# Patient Record
Sex: FEMALE
[De-identification: names, ages, dates, MRNs, and addresses within clinical notes are randomized per-mention and may not be internally consistent; named-entity substitution may affect disease eponyms.]

---

## 2019-11-22 ENCOUNTER — TRANSCRIPTION ENCOUNTER (OUTPATIENT)
Age: 69
End: 2019-11-22

## 2019-11-22 ENCOUNTER — INPATIENT (INPATIENT)
Facility: HOSPITAL | Age: 69
LOS: 4 days | Discharge: HOME | DRG: 907 | End: 2019-11-27
Attending: SURGERY | Admitting: SURGERY
Payer: MEDICARE

## 2019-11-22 VITALS
RESPIRATION RATE: 16 BRPM | SYSTOLIC BLOOD PRESSURE: 154 MMHG | WEIGHT: 153 LBS | DIASTOLIC BLOOD PRESSURE: 76 MMHG | HEIGHT: 68 IN | OXYGEN SATURATION: 99 % | HEART RATE: 99 BPM | TEMPERATURE: 98 F

## 2019-11-22 DIAGNOSIS — R19.8 OTHER SPECIFIED SYMPTOMS AND SIGNS INVOLVING THE DIGESTIVE SYSTEM AND ABDOMEN: ICD-10-CM

## 2019-11-22 LAB
ALBUMIN SERPL ELPH-MCNC: 4.5 G/DL — SIGNIFICANT CHANGE UP (ref 3.3–5)
ALP SERPL-CCNC: 101 U/L — SIGNIFICANT CHANGE UP (ref 40–120)
ALT FLD-CCNC: 25 U/L — SIGNIFICANT CHANGE UP (ref 10–45)
ANION GAP SERPL CALC-SCNC: 19 MMOL/L — HIGH (ref 5–17)
APPEARANCE UR: CLEAR — SIGNIFICANT CHANGE UP
APTT BLD: 23.5 SEC — LOW (ref 27.5–36.3)
AST SERPL-CCNC: 29 U/L — SIGNIFICANT CHANGE UP (ref 10–40)
BACTERIA # UR AUTO: NEGATIVE — SIGNIFICANT CHANGE UP
BASE EXCESS BLDV CALC-SCNC: -1.7 MMOL/L — SIGNIFICANT CHANGE UP (ref -2–2)
BASOPHILS # BLD AUTO: 0.01 K/UL — SIGNIFICANT CHANGE UP (ref 0–0.2)
BASOPHILS NFR BLD AUTO: 0.1 % — SIGNIFICANT CHANGE UP (ref 0–2)
BILIRUB SERPL-MCNC: 0.9 MG/DL — SIGNIFICANT CHANGE UP (ref 0.2–1.2)
BILIRUB UR-MCNC: NEGATIVE — SIGNIFICANT CHANGE UP
BLD GP AB SCN SERPL QL: NEGATIVE — SIGNIFICANT CHANGE UP
BUN SERPL-MCNC: 17 MG/DL — SIGNIFICANT CHANGE UP (ref 7–23)
CA-I SERPL-SCNC: 1.11 MMOL/L — LOW (ref 1.12–1.3)
CALCIUM SERPL-MCNC: 8.9 MG/DL — SIGNIFICANT CHANGE UP (ref 8.4–10.5)
CHLORIDE BLDV-SCNC: 108 MMOL/L — SIGNIFICANT CHANGE UP (ref 96–108)
CHLORIDE SERPL-SCNC: 100 MMOL/L — SIGNIFICANT CHANGE UP (ref 96–108)
CO2 BLDV-SCNC: 25 MMOL/L — SIGNIFICANT CHANGE UP (ref 22–30)
CO2 SERPL-SCNC: 19 MMOL/L — LOW (ref 22–31)
COLOR SPEC: SIGNIFICANT CHANGE UP
CREAT SERPL-MCNC: 0.78 MG/DL — SIGNIFICANT CHANGE UP (ref 0.5–1.3)
DIFF PNL FLD: ABNORMAL
EOSINOPHIL # BLD AUTO: 0.21 K/UL — SIGNIFICANT CHANGE UP (ref 0–0.5)
EOSINOPHIL NFR BLD AUTO: 2.1 % — SIGNIFICANT CHANGE UP (ref 0–6)
EPI CELLS # UR: 5 /HPF — SIGNIFICANT CHANGE UP
GAS PNL BLDV: 136 MMOL/L — SIGNIFICANT CHANGE UP (ref 135–145)
GAS PNL BLDV: SIGNIFICANT CHANGE UP
GLUCOSE BLDV-MCNC: 190 MG/DL — HIGH (ref 70–99)
GLUCOSE SERPL-MCNC: 240 MG/DL — HIGH (ref 70–99)
GLUCOSE UR QL: ABNORMAL
HCO3 BLDV-SCNC: 24 MMOL/L — SIGNIFICANT CHANGE UP (ref 21–29)
HCT VFR BLD CALC: 41.2 % — SIGNIFICANT CHANGE UP (ref 34.5–45)
HCT VFR BLDA CALC: 44 % — SIGNIFICANT CHANGE UP (ref 39–50)
HGB BLD CALC-MCNC: 14.2 G/DL — SIGNIFICANT CHANGE UP (ref 11.5–15.5)
HGB BLD-MCNC: 13.4 G/DL — SIGNIFICANT CHANGE UP (ref 11.5–15.5)
HYALINE CASTS # UR AUTO: 1 /LPF — SIGNIFICANT CHANGE UP (ref 0–2)
IMM GRANULOCYTES NFR BLD AUTO: 0.3 % — SIGNIFICANT CHANGE UP (ref 0–1.5)
INR BLD: 1.01 RATIO — SIGNIFICANT CHANGE UP (ref 0.88–1.16)
KETONES UR-MCNC: SIGNIFICANT CHANGE UP
LACTATE BLDV-MCNC: 3.9 MMOL/L — HIGH (ref 0.7–2)
LEUKOCYTE ESTERASE UR-ACNC: ABNORMAL
LIDOCAIN IGE QN: 10 U/L — SIGNIFICANT CHANGE UP (ref 7–60)
LYMPHOCYTES # BLD AUTO: 0.85 K/UL — LOW (ref 1–3.3)
LYMPHOCYTES # BLD AUTO: 8.5 % — LOW (ref 13–44)
MCHC RBC-ENTMCNC: 29.6 PG — SIGNIFICANT CHANGE UP (ref 27–34)
MCHC RBC-ENTMCNC: 32.5 GM/DL — SIGNIFICANT CHANGE UP (ref 32–36)
MCV RBC AUTO: 91.2 FL — SIGNIFICANT CHANGE UP (ref 80–100)
MONOCYTES # BLD AUTO: 0.34 K/UL — SIGNIFICANT CHANGE UP (ref 0–0.9)
MONOCYTES NFR BLD AUTO: 3.4 % — SIGNIFICANT CHANGE UP (ref 2–14)
NEUTROPHILS # BLD AUTO: 8.57 K/UL — HIGH (ref 1.8–7.4)
NEUTROPHILS NFR BLD AUTO: 85.6 % — HIGH (ref 43–77)
NITRITE UR-MCNC: NEGATIVE — SIGNIFICANT CHANGE UP
NRBC # BLD: 0 /100 WBCS — SIGNIFICANT CHANGE UP (ref 0–0)
PCO2 BLDV: 44 MMHG — SIGNIFICANT CHANGE UP (ref 35–50)
PH BLDV: 7.35 — SIGNIFICANT CHANGE UP (ref 7.35–7.45)
PH UR: 6.5 — SIGNIFICANT CHANGE UP (ref 5–8)
PLATELET # BLD AUTO: 177 K/UL — SIGNIFICANT CHANGE UP (ref 150–400)
PO2 BLDV: 34 MMHG — SIGNIFICANT CHANGE UP (ref 25–45)
POTASSIUM BLDV-SCNC: 3.3 MMOL/L — LOW (ref 3.5–5.3)
POTASSIUM SERPL-MCNC: 3.7 MMOL/L — SIGNIFICANT CHANGE UP (ref 3.5–5.3)
POTASSIUM SERPL-SCNC: 3.7 MMOL/L — SIGNIFICANT CHANGE UP (ref 3.5–5.3)
PROT SERPL-MCNC: 7.6 G/DL — SIGNIFICANT CHANGE UP (ref 6–8.3)
PROT UR-MCNC: SIGNIFICANT CHANGE UP
PROTHROM AB SERPL-ACNC: 11.6 SEC — SIGNIFICANT CHANGE UP (ref 10–12.9)
RBC # BLD: 4.52 M/UL — SIGNIFICANT CHANGE UP (ref 3.8–5.2)
RBC # FLD: 13.2 % — SIGNIFICANT CHANGE UP (ref 10.3–14.5)
RBC CASTS # UR COMP ASSIST: 39 /HPF — HIGH (ref 0–4)
RH IG SCN BLD-IMP: POSITIVE — SIGNIFICANT CHANGE UP
SAO2 % BLDV: 60 % — LOW (ref 67–88)
SODIUM SERPL-SCNC: 138 MMOL/L — SIGNIFICANT CHANGE UP (ref 135–145)
SP GR SPEC: 1.02 — SIGNIFICANT CHANGE UP (ref 1.01–1.02)
UROBILINOGEN FLD QL: NEGATIVE — SIGNIFICANT CHANGE UP
WBC # BLD: 10.01 K/UL — SIGNIFICANT CHANGE UP (ref 3.8–10.5)
WBC # FLD AUTO: 10.01 K/UL — SIGNIFICANT CHANGE UP (ref 3.8–10.5)
WBC UR QL: 14 /HPF — HIGH (ref 0–5)

## 2019-11-22 PROCEDURE — 99291 CRITICAL CARE FIRST HOUR: CPT | Mod: GC

## 2019-11-22 PROCEDURE — 74177 CT ABD & PELVIS W/CONTRAST: CPT | Mod: 26

## 2019-11-22 PROCEDURE — 71046 X-RAY EXAM CHEST 2 VIEWS: CPT | Mod: 26

## 2019-11-22 RX ORDER — PIPERACILLIN AND TAZOBACTAM 4; .5 G/20ML; G/20ML
3.38 INJECTION, POWDER, LYOPHILIZED, FOR SOLUTION INTRAVENOUS ONCE
Refills: 0 | Status: DISCONTINUED | OUTPATIENT
Start: 2019-11-22 | End: 2019-11-22

## 2019-11-22 RX ORDER — ONDANSETRON 8 MG/1
4 TABLET, FILM COATED ORAL ONCE
Refills: 0 | Status: COMPLETED | OUTPATIENT
Start: 2019-11-22 | End: 2019-11-22

## 2019-11-22 RX ORDER — PIPERACILLIN AND TAZOBACTAM 4; .5 G/20ML; G/20ML
3.38 INJECTION, POWDER, LYOPHILIZED, FOR SOLUTION INTRAVENOUS ONCE
Refills: 0 | Status: COMPLETED | OUTPATIENT
Start: 2019-11-22 | End: 2019-11-22

## 2019-11-22 RX ORDER — SODIUM CHLORIDE 9 MG/ML
1000 INJECTION, SOLUTION INTRAVENOUS ONCE
Refills: 0 | Status: COMPLETED | OUTPATIENT
Start: 2019-11-22 | End: 2019-11-22

## 2019-11-22 RX ORDER — MORPHINE SULFATE 50 MG/1
4 CAPSULE, EXTENDED RELEASE ORAL ONCE
Refills: 0 | Status: DISCONTINUED | OUTPATIENT
Start: 2019-11-22 | End: 2019-11-22

## 2019-11-22 RX ADMIN — SODIUM CHLORIDE 1000 MILLILITER(S): 9 INJECTION, SOLUTION INTRAVENOUS at 20:58

## 2019-11-22 RX ADMIN — ONDANSETRON 4 MILLIGRAM(S): 8 TABLET, FILM COATED ORAL at 20:37

## 2019-11-22 RX ADMIN — MORPHINE SULFATE 4 MILLIGRAM(S): 50 CAPSULE, EXTENDED RELEASE ORAL at 21:55

## 2019-11-22 RX ADMIN — ONDANSETRON 4 MILLIGRAM(S): 8 TABLET, FILM COATED ORAL at 21:55

## 2019-11-22 RX ADMIN — MORPHINE SULFATE 4 MILLIGRAM(S): 50 CAPSULE, EXTENDED RELEASE ORAL at 20:35

## 2019-11-22 RX ADMIN — PIPERACILLIN AND TAZOBACTAM 200 GRAM(S): 4; .5 INJECTION, POWDER, LYOPHILIZED, FOR SOLUTION INTRAVENOUS at 21:55

## 2019-11-22 NOTE — ED ADULT NURSE NOTE - NS ED NURSE REPORT GIVEN TO FT
Once admitted, patient going straight to OR. All valuables given to family and sent home. Report given to OR RN. RN aware that patient has belongings in ED. Patient fully undressed wearing gown and hospital socks. Awaiting transport to OR.

## 2019-11-22 NOTE — ED ADULT NURSE NOTE - OBJECTIVE STATEMENT
69F bib family from home with c/o abd pain started after colonoscopy today. Patilolitan reports h/o hemorrhoids which her PMD sent her colonoscopy. Also c/o nausea, abd soft, nt/nd. Denies any urine from blood and stool, reports passing gas.

## 2019-11-22 NOTE — ED PROVIDER NOTE - CLINICAL SUMMARY MEDICAL DECISION MAKING FREE TEXT BOX
70 y/o F presenting for abd pain. Given amount of pain and s/p colonoscopy concern for perforation, will obtain stat CXR to evaluate for free air. - Judah Montiel, DO PGY-1

## 2019-11-22 NOTE — ED PROVIDER NOTE - PHYSICAL EXAMINATION
gen: in distress  Mentation: AAO x 3  psych: mood appropriate  ENT: airway patent  Eyes: conjunctivae clear bilaterally  Cardio: RRR, no m/r/g  Resp: normal BS b/l  GI: soft, diffusely tender  Neuro: sensation and motor function intact  Skin: No evidence of rash  MSK: normal movement of all extremities  Lymph/Vasc: no LE edema

## 2019-11-22 NOTE — ED ADULT NURSE NOTE - NSIMPLEMENTINTERV_GEN_ALL_ED
Implemented All Fall Risk Interventions:  [place] to call system. Call bell, personal items and telephone within reach. Instruct patient to call for assistance. Room bathroom lighting operational. Non-slip footwear when patient is off stretcher. Physically safe environment: no spills, clutter or unnecessary equipment. Stretcher in lowest position, wheels locked, appropriate side rails in place. Provide visual cue, wrist band, yellow gown, etc. Monitor gait and stability. Monitor for mental status changes and reorient to person, place, and time. Review medications for side effects contributing to fall risk. Reinforce activity limits and safety measures with patient and family.

## 2019-11-22 NOTE — ED PROVIDER NOTE - PROGRESS NOTE DETAILS
Notified by radiology that patient has subdiaphragmatic free air, appreciated on xray. Surg consulted for perf mandeep - Judah Montiel DO PGY-1 Attending MD Aden: Surgery in ED evaluating patient

## 2019-11-22 NOTE — ED PROVIDER NOTE - ATTENDING CONTRIBUTION TO CARE
Attending MD Aden: I personally have seen and examined this patient.  Resident note reviewed and agree on plan of care and except where noted.  See below for details.     Seen in RH2, Pepito son  Alma Rosa, declined telephone     69F with no reported PMH/PSH, denies meds, denies allergies presents to the ED with abdominal pain s/p colonoscopy.   Reports had Colonoscopy today at 11am, reports that she had a previous colonoscopy about two months ago that showed only hemorrhoids.  Unknown why she needed repeat today.  Reports colonoscopy was done with Dr. Simental on [facility] in [place].  Reports that was told that everything went ok.  Reports is having diffuse abdominal pain since colonoscopy, worse since 3pm.  Reports had small BM, nonbloody after.  Denies dysuria, hematuria, change in urinary habits including frequency, urgency. Denies chest pain, shortness of breath, palpitations. Denies fevers chills.  On exam, grimacing with movement, head NCAT, PERRL, FROM at neck, no tenderness to midline palpation, no stepoffs along length of spine, lungs CTAB with good inspiratory effort, +S1S2, no m/r/g, abdomen soft with +BS, +tenderness diffusely across abdomen, mostly upper mid to upper abdomen, greatest in L mid and upper abdomen, no rebound, no guarding, no CVAT, moving all extremities with 5/5 strength bilateral upper and lower extremities, good and equal  strength bilaterally; A/P:  69F with abdominal pain after colonoscopy earlier today, CXR to rule out free air under diaphragm, DDx includes perf, colitis, will obtain labs, CTAP, will keep npo, give IVFs, pain control, reasess Attending MD Aden: I personally have seen and examined this patient.  Resident note reviewed and agree on plan of care and except where noted.  See below for details.     Seen in RH2, Pepito son  Alma Rosa, declined telephone     69F with no reported PMH/PSH, denies meds, denies allergies presents to the ED with abdominal pain s/p colonoscopy.   Reports had Colonoscopy today at 11am, reports that she had a previous colonoscopy about two months ago that showed only hemorrhoids.  Unknown why she needed repeat today.  Reports colonoscopy was done with Dr. Simental on [facility] in [place].  Reports that was told that everything went ok.  Reports is having diffuse abdominal pain since colonoscopy, worse since 3pm.  Reports had small BM, nonbloody after.  Denies dysuria, hematuria, change in urinary habits including frequency, urgency. Denies chest pain, shortness of breath, palpitations. Denies fevers chills.  On exam, grimacing with movement, head NCAT, PERRL, FROM at neck, no tenderness to midline palpation, no stepoffs along length of spine, lungs CTAB with good inspiratory effort, +S1S2, no m/r/g, abdomen soft with +BS, +marked tenderness diffusely across abdomen, mostly upper mid to upper abdomen, greatest in L mid and upper abdomen, +peritoneal, no guarding, no CVAT, moving all extremities with 5/5 strength bilateral upper and lower extremities, good and equal  strength bilaterally; A/P:  69F with abdominal pain after colonoscopy earlier today, CXR to rule out free air under diaphragm, high suspicion, DDx includes perf, colitis, will obtain labs, CTAP, will keep npo, give IVFs, pain control, reassess

## 2019-11-22 NOTE — ED PROVIDER NOTE - NS ED ROS FT
CONSTITUTIONAL: No fevers, no chills, no lightheadedness, no dizziness  Eyes: no visual changes  Ears: no ear drainage, no ear pain  Nose: no nasal congestion  Mouth/Throat: no sore throat  CV: No chest pain, no palpitations  PULM: No SOB, no cough  GI: +n/v, abd pain  : no dysuria, no hematuria  SKIN: no rashes.  NEURO: no headache, no focal weakness or numbness  LYMPH/VASC: no LE swelling

## 2019-11-22 NOTE — ED PROVIDER NOTE - OBJECTIVE STATEMENT
68 y/o F with no reported PMH presenting after colonoscopy today. Patient has severe pain with n/v. Not tolerating PO. Passing gas and stool. No blood in stool or vomitus. Was sent in by GI due to amount pain.

## 2019-11-23 ENCOUNTER — RESULT REVIEW (OUTPATIENT)
Age: 69
End: 2019-11-23

## 2019-11-23 LAB
ANION GAP SERPL CALC-SCNC: 19 MMOL/L — HIGH (ref 5–17)
BUN SERPL-MCNC: 10 MG/DL — SIGNIFICANT CHANGE UP (ref 7–23)
CALCIUM SERPL-MCNC: 7.6 MG/DL — LOW (ref 8.4–10.5)
CHLORIDE SERPL-SCNC: 99 MMOL/L — SIGNIFICANT CHANGE UP (ref 96–108)
CO2 SERPL-SCNC: 19 MMOL/L — LOW (ref 22–31)
CREAT SERPL-MCNC: 0.67 MG/DL — SIGNIFICANT CHANGE UP (ref 0.5–1.3)
GLUCOSE SERPL-MCNC: 192 MG/DL — HIGH (ref 70–99)
HCT VFR BLD CALC: 41.8 % — SIGNIFICANT CHANGE UP (ref 34.5–45)
HGB BLD-MCNC: 13.8 G/DL — SIGNIFICANT CHANGE UP (ref 11.5–15.5)
MAGNESIUM SERPL-MCNC: 1.8 MG/DL — SIGNIFICANT CHANGE UP (ref 1.6–2.6)
MCHC RBC-ENTMCNC: 29.9 PG — SIGNIFICANT CHANGE UP (ref 27–34)
MCHC RBC-ENTMCNC: 33 GM/DL — SIGNIFICANT CHANGE UP (ref 32–36)
MCV RBC AUTO: 90.5 FL — SIGNIFICANT CHANGE UP (ref 80–100)
NRBC # BLD: 0 /100 WBCS — SIGNIFICANT CHANGE UP (ref 0–0)
PHOSPHATE SERPL-MCNC: 2.6 MG/DL — SIGNIFICANT CHANGE UP (ref 2.5–4.5)
PLATELET # BLD AUTO: 130 K/UL — LOW (ref 150–400)
POTASSIUM SERPL-MCNC: 3.3 MMOL/L — LOW (ref 3.5–5.3)
POTASSIUM SERPL-SCNC: 3.3 MMOL/L — LOW (ref 3.5–5.3)
RBC # BLD: 4.62 M/UL — SIGNIFICANT CHANGE UP (ref 3.8–5.2)
RBC # FLD: 13.3 % — SIGNIFICANT CHANGE UP (ref 10.3–14.5)
SODIUM SERPL-SCNC: 137 MMOL/L — SIGNIFICANT CHANGE UP (ref 135–145)
WBC # BLD: 6.83 K/UL — SIGNIFICANT CHANGE UP (ref 3.8–10.5)
WBC # FLD AUTO: 6.83 K/UL — SIGNIFICANT CHANGE UP (ref 3.8–10.5)

## 2019-11-23 PROCEDURE — 88307 TISSUE EXAM BY PATHOLOGIST: CPT | Mod: 26

## 2019-11-23 PROCEDURE — 44143 PARTIAL REMOVAL OF COLON: CPT

## 2019-11-23 RX ORDER — PANTOPRAZOLE SODIUM 20 MG/1
40 TABLET, DELAYED RELEASE ORAL DAILY
Refills: 0 | Status: DISCONTINUED | OUTPATIENT
Start: 2019-11-23 | End: 2019-11-23

## 2019-11-23 RX ORDER — ACETAMINOPHEN 500 MG
1000 TABLET ORAL ONCE
Refills: 0 | Status: COMPLETED | OUTPATIENT
Start: 2019-11-23 | End: 2019-11-23

## 2019-11-23 RX ORDER — ENOXAPARIN SODIUM 100 MG/ML
40 INJECTION SUBCUTANEOUS DAILY
Refills: 0 | Status: DISCONTINUED | OUTPATIENT
Start: 2019-11-23 | End: 2019-11-23

## 2019-11-23 RX ORDER — MAGNESIUM SULFATE 500 MG/ML
2 VIAL (ML) INJECTION ONCE
Refills: 0 | Status: COMPLETED | OUTPATIENT
Start: 2019-11-23 | End: 2019-11-23

## 2019-11-23 RX ORDER — HYDROMORPHONE HYDROCHLORIDE 2 MG/ML
0.5 INJECTION INTRAMUSCULAR; INTRAVENOUS; SUBCUTANEOUS EVERY 4 HOURS
Refills: 0 | Status: DISCONTINUED | OUTPATIENT
Start: 2019-11-23 | End: 2019-11-23

## 2019-11-23 RX ORDER — DEXTROSE MONOHYDRATE, SODIUM CHLORIDE, AND POTASSIUM CHLORIDE 50; .745; 4.5 G/1000ML; G/1000ML; G/1000ML
1000 INJECTION, SOLUTION INTRAVENOUS
Refills: 0 | Status: DISCONTINUED | OUTPATIENT
Start: 2019-11-23 | End: 2019-11-27

## 2019-11-23 RX ORDER — SODIUM CHLORIDE 9 MG/ML
1000 INJECTION, SOLUTION INTRAVENOUS
Refills: 0 | Status: DISCONTINUED | OUTPATIENT
Start: 2019-11-23 | End: 2019-11-23

## 2019-11-23 RX ORDER — HYDROMORPHONE HYDROCHLORIDE 2 MG/ML
0.5 INJECTION INTRAMUSCULAR; INTRAVENOUS; SUBCUTANEOUS
Refills: 0 | Status: DISCONTINUED | OUTPATIENT
Start: 2019-11-23 | End: 2019-11-25

## 2019-11-23 RX ORDER — ACETAMINOPHEN 500 MG
1000 TABLET ORAL ONCE
Refills: 0 | Status: COMPLETED | OUTPATIENT
Start: 2019-11-23 | End: 2019-11-24

## 2019-11-23 RX ORDER — HYDROMORPHONE HYDROCHLORIDE 2 MG/ML
1 INJECTION INTRAMUSCULAR; INTRAVENOUS; SUBCUTANEOUS EVERY 4 HOURS
Refills: 0 | Status: DISCONTINUED | OUTPATIENT
Start: 2019-11-23 | End: 2019-11-23

## 2019-11-23 RX ORDER — HYDROMORPHONE HYDROCHLORIDE 2 MG/ML
30 INJECTION INTRAMUSCULAR; INTRAVENOUS; SUBCUTANEOUS
Refills: 0 | Status: DISCONTINUED | OUTPATIENT
Start: 2019-11-23 | End: 2019-11-25

## 2019-11-23 RX ORDER — ONDANSETRON 8 MG/1
4 TABLET, FILM COATED ORAL EVERY 6 HOURS
Refills: 0 | Status: DISCONTINUED | OUTPATIENT
Start: 2019-11-23 | End: 2019-11-25

## 2019-11-23 RX ORDER — POTASSIUM CHLORIDE 20 MEQ
10 PACKET (EA) ORAL
Refills: 0 | Status: COMPLETED | OUTPATIENT
Start: 2019-11-23 | End: 2019-11-23

## 2019-11-23 RX ORDER — ENOXAPARIN SODIUM 100 MG/ML
40 INJECTION SUBCUTANEOUS DAILY
Refills: 0 | Status: DISCONTINUED | OUTPATIENT
Start: 2019-11-23 | End: 2019-11-27

## 2019-11-23 RX ORDER — ACETAMINOPHEN 500 MG
1000 TABLET ORAL ONCE
Refills: 0 | Status: DISCONTINUED | OUTPATIENT
Start: 2019-11-23 | End: 2019-11-23

## 2019-11-23 RX ORDER — SODIUM CHLORIDE 9 MG/ML
1000 INJECTION, SOLUTION INTRAVENOUS ONCE
Refills: 0 | Status: COMPLETED | OUTPATIENT
Start: 2019-11-23 | End: 2019-11-23

## 2019-11-23 RX ORDER — PIPERACILLIN AND TAZOBACTAM 4; .5 G/20ML; G/20ML
3.38 INJECTION, POWDER, LYOPHILIZED, FOR SOLUTION INTRAVENOUS EVERY 8 HOURS
Refills: 0 | Status: DISCONTINUED | OUTPATIENT
Start: 2019-11-23 | End: 2019-11-27

## 2019-11-23 RX ORDER — ONDANSETRON 8 MG/1
4 TABLET, FILM COATED ORAL ONCE
Refills: 0 | Status: DISCONTINUED | OUTPATIENT
Start: 2019-11-23 | End: 2019-11-23

## 2019-11-23 RX ORDER — HYDROMORPHONE HYDROCHLORIDE 2 MG/ML
0.25 INJECTION INTRAMUSCULAR; INTRAVENOUS; SUBCUTANEOUS
Refills: 0 | Status: DISCONTINUED | OUTPATIENT
Start: 2019-11-23 | End: 2019-11-23

## 2019-11-23 RX ORDER — NALOXONE HYDROCHLORIDE 4 MG/.1ML
0.1 SPRAY NASAL
Refills: 0 | Status: DISCONTINUED | OUTPATIENT
Start: 2019-11-23 | End: 2019-11-25

## 2019-11-23 RX ADMIN — ENOXAPARIN SODIUM 40 MILLIGRAM(S): 100 INJECTION SUBCUTANEOUS at 12:55

## 2019-11-23 RX ADMIN — SODIUM CHLORIDE 100 MILLILITER(S): 9 INJECTION, SOLUTION INTRAVENOUS at 10:08

## 2019-11-23 RX ADMIN — PIPERACILLIN AND TAZOBACTAM 25 GRAM(S): 4; .5 INJECTION, POWDER, LYOPHILIZED, FOR SOLUTION INTRAVENOUS at 05:37

## 2019-11-23 RX ADMIN — PIPERACILLIN AND TAZOBACTAM 25 GRAM(S): 4; .5 INJECTION, POWDER, LYOPHILIZED, FOR SOLUTION INTRAVENOUS at 21:23

## 2019-11-23 RX ADMIN — PIPERACILLIN AND TAZOBACTAM 25 GRAM(S): 4; .5 INJECTION, POWDER, LYOPHILIZED, FOR SOLUTION INTRAVENOUS at 14:47

## 2019-11-23 RX ADMIN — Medication 100 MILLIEQUIVALENT(S): at 14:02

## 2019-11-23 RX ADMIN — HYDROMORPHONE HYDROCHLORIDE 30 MILLILITER(S): 2 INJECTION INTRAMUSCULAR; INTRAVENOUS; SUBCUTANEOUS at 09:50

## 2019-11-23 RX ADMIN — HYDROMORPHONE HYDROCHLORIDE 0.25 MILLIGRAM(S): 2 INJECTION INTRAMUSCULAR; INTRAVENOUS; SUBCUTANEOUS at 05:31

## 2019-11-23 RX ADMIN — Medication 1000 MILLIGRAM(S): at 23:35

## 2019-11-23 RX ADMIN — HYDROMORPHONE HYDROCHLORIDE 30 MILLILITER(S): 2 INJECTION INTRAMUSCULAR; INTRAVENOUS; SUBCUTANEOUS at 10:04

## 2019-11-23 RX ADMIN — HYDROMORPHONE HYDROCHLORIDE 30 MILLILITER(S): 2 INJECTION INTRAMUSCULAR; INTRAVENOUS; SUBCUTANEOUS at 08:24

## 2019-11-23 RX ADMIN — DEXTROSE MONOHYDRATE, SODIUM CHLORIDE, AND POTASSIUM CHLORIDE 75 MILLILITER(S): 50; .745; 4.5 INJECTION, SOLUTION INTRAVENOUS at 17:09

## 2019-11-23 RX ADMIN — Medication 100 MILLIEQUIVALENT(S): at 16:10

## 2019-11-23 RX ADMIN — Medication 400 MILLIGRAM(S): at 17:06

## 2019-11-23 RX ADMIN — Medication 1000 MILLIGRAM(S): at 11:00

## 2019-11-23 RX ADMIN — HYDROMORPHONE HYDROCHLORIDE 0.25 MILLIGRAM(S): 2 INJECTION INTRAMUSCULAR; INTRAVENOUS; SUBCUTANEOUS at 06:01

## 2019-11-23 RX ADMIN — SODIUM CHLORIDE 1000 MILLILITER(S): 9 INJECTION, SOLUTION INTRAVENOUS at 00:22

## 2019-11-23 RX ADMIN — HYDROMORPHONE HYDROCHLORIDE 30 MILLILITER(S): 2 INJECTION INTRAMUSCULAR; INTRAVENOUS; SUBCUTANEOUS at 06:36

## 2019-11-23 RX ADMIN — Medication 400 MILLIGRAM(S): at 10:27

## 2019-11-23 RX ADMIN — HYDROMORPHONE HYDROCHLORIDE 30 MILLILITER(S): 2 INJECTION INTRAMUSCULAR; INTRAVENOUS; SUBCUTANEOUS at 19:53

## 2019-11-23 RX ADMIN — Medication 1000 MILLIGRAM(S): at 17:50

## 2019-11-23 RX ADMIN — Medication 50 GRAM(S): at 12:56

## 2019-11-23 RX ADMIN — Medication 100 MILLIEQUIVALENT(S): at 17:49

## 2019-11-23 RX ADMIN — SODIUM CHLORIDE 1000 MILLILITER(S): 9 INJECTION, SOLUTION INTRAVENOUS at 00:21

## 2019-11-23 RX ADMIN — Medication 62.5 MILLIMOLE(S): at 19:09

## 2019-11-23 RX ADMIN — Medication 400 MILLIGRAM(S): at 23:09

## 2019-11-23 RX ADMIN — SODIUM CHLORIDE 100 MILLILITER(S): 9 INJECTION, SOLUTION INTRAVENOUS at 05:39

## 2019-11-23 NOTE — H&P ADULT - ASSESSMENT
69F with no significant pmh presenting with abdominal pain after screening colonoscopy with polypectomy and biopsies noted to have bowel perforation on imaging    - Admit to Surgery, Dr Zepeda  - OR for emergent exploratory laparotomy  - NPO/IVF  - Abx: Zosyn  - Pain control  - DVT ppx: Kael Flores, PGY2  Acute Care Surgery  [phone] with any questions

## 2019-11-23 NOTE — PRE-ANESTHESIA EVALUATION ADULT - NSANTHOSAYNRD_GEN_A_CORE
No. JERAMIE screening performed.  STOP BANG Legend: 0-2 = LOW Risk; 3-4 = INTERMEDIATE Risk; 5-8 = HIGH Risk

## 2019-11-23 NOTE — BRIEF OPERATIVE NOTE - NSICDXBRIEFPREOP_GEN_ALL_CORE_FT
PRE-OP DIAGNOSIS:  Perforation of colon as colonoscopy complication 23-Nov-2019 08:05:16  Malcolm Boyce

## 2019-11-23 NOTE — H&P ADULT - HISTORY OF PRESENT ILLNESS
[facility] General Surgery H&P    Patient is a 69y old  Female who presents with a chief complaint of abdominal pain.    HPI:    69F with no significant pmh presenting with abdominal pain. Patient states she began having diffuse abdominal pain a few hours after screening colonoscopy at 10AM. Per GI physician (Dr. Simental), patient underwent sigmoid polypectomy and multiple biopsies. Patient currently has severe pain with episodes of nausea and vomiting. Passing gas and non-bloody/non-melanotic stool. Patient was sent in by GI due to amount pain. In ED, patient had CXR which demonstrated significant subdiaphragmatic air. Surgery was consulted for evaluation.    PAST MEDICAL & SURGICAL HISTORY:  No pertinent past medical history  No significant past surgical history      FAMILY HISTORY:    SOCIAL HISTORY:  - No smoking or ETOH use  - Lives in private residence  - Active lifestyle    MEDICATIONS  (STANDING):    MEDICATIONS  (PRN):    Allergies    No Known Allergies    Intolerances        Vital Signs Last 24 Hrs  T(C): 36.6 (2019 21:01), Max: 36.6 (2019 21:01)  T(F): 97.9 (2019 21:01), Max: 97.9 (2019 21:01)  HR: 106 (2019 22:11) (88 - 106)  BP: 128/79 (2019 22:11) (128/79 - 154/76)  BP(mean): --  RR: 18 (2019 22:11) (16 - 18)  SpO2: 99% (2019 22:11) (99% - 99%)  Daily Height in cm: 172.72 (2019 19:51)    Daily     General: NAD, well-nourished  HEENT: Atraumatic, EOMI  Resp: Breathing comfortably on RA  CV: Normal sinus rhythm  Abd: soft, moderately distended, exquisite diffuse tenderness with guarding consistent with peritonitis  Ext: ROMIx4, motor strength intact x 4                          13.4   10.01 )-----------( 177      ( 2019 20:54 )             41.2     11-    138  |  100  |  17  ----------------------------<  240<H>  3.7   |  19<L>  |  0.78    Ca    8.9      2019 20:54    TPro  7.6  /  Alb  4.5  /  TBili  0.9  /  DBili  x   /  AST  29  /  ALT  25  /  AlkPhos  101  11-22    PT/INR - ( 2019 20:54 )   PT: 11.6 sec;   INR: 1.01 ratio         PTT - ( 2019 20:54 )  PTT:23.5 sec  Urinalysis Basic - ( 2019 22:14 )    Color: Light Yellow / Appearance: Clear / S.017 / pH: x  Gluc: x / Ketone: Trace  / Bili: Negative / Urobili: Negative   Blood: x / Protein: Trace / Nitrite: Negative   Leuk Esterase: Moderate / RBC: 39 /hpf / WBC 14 /HPF   Sq Epi: x / Non Sq Epi: 5 /hpf / Bacteria: Negative        Radiographic Findings:     < from: Xray Chest 2 Views PA/Lat (19 @ 21:16) >  ******PRELIMINARY REPORT******              INTERPRETATION:  subdiaphragmatic free air.     clear lungs     d/w Dr. Montiel    follow up official report in AM              ******PRELIMINARY REPORT******    ******PRELIMINARY REPORT******          BRIE CLAROS M.D., RADIOLOGY RESIDENT    < end of copied text >        Assessment: [facility] General Surgery H&P    Patient is a 69y old  Female who presents with a chief complaint of abdominal pain.    HPI:    69F with no significant pmh presenting with abdominal pain. Patient states she began having diffuse abdominal pain a few hours after screening colonoscopy at 10AM. Per GI physician (Dr. Simental), patient underwent sigmoid polypectomy and multiple biopsies. Patient currently has severe pain with episodes of nausea and vomiting. Passing gas and non-bloody/non-melanotic stool. Patient was sent in by GI due to amount pain. In ED, patient had CXR which demonstrated significant subdiaphragmatic air. Surgery was consulted for evaluation.    PAST MEDICAL & SURGICAL HISTORY:  No pertinent past medical history  No significant past surgical history      FAMILY HISTORY:    SOCIAL HISTORY:  - No smoking or ETOH use  - Lives in private residence  - Active lifestyle    MEDICATIONS  (STANDING):    MEDICATIONS  (PRN):    Allergies    No Known Allergies    Intolerances        Vital Signs Last 24 Hrs  T(C): 36.6 (2019 21:01), Max: 36.6 (2019 21:01)  T(F): 97.9 (2019 21:01), Max: 97.9 (2019 21:01)  HR: 106 (2019 22:11) (88 - 106)  BP: 128/79 (2019 22:11) (128/79 - 154/76)  BP(mean): --  RR: 18 (2019 22:11) (16 - 18)  SpO2: 99% (2019 22:11) (99% - 99%)  Daily Height in cm: 172.72 (2019 19:51)    Daily     General: NAD, well-nourished  HEENT: Atraumatic, EOMI  Resp: Breathing comfortably on RA  CV: Normal sinus rhythm  Abd: soft, moderately distended, exquisite diffuse tenderness with guarding consistent with peritonitis  Ext: ROMIx4, motor strength intact x 4                          13.4   10.01 )-----------( 177      ( 2019 20:54 )             41.2     11-    138  |  100  |  17  ----------------------------<  240<H>  3.7   |  19<L>  |  0.78    Ca    8.9      2019 20:54    TPro  7.6  /  Alb  4.5  /  TBili  0.9  /  DBili  x   /  AST  29  /  ALT  25  /  AlkPhos  101  11-22    PT/INR - ( 2019 20:54 )   PT: 11.6 sec;   INR: 1.01 ratio         PTT - ( 2019 20:54 )  PTT:23.5 sec  Urinalysis Basic - ( 2019 22:14 )    Color: Light Yellow / Appearance: Clear / S.017 / pH: x  Gluc: x / Ketone: Trace  / Bili: Negative / Urobili: Negative   Blood: x / Protein: Trace / Nitrite: Negative   Leuk Esterase: Moderate / RBC: 39 /hpf / WBC 14 /HPF   Sq Epi: x / Non Sq Epi: 5 /hpf / Bacteria: Negative        Radiographic Findings:     < from: Xray Chest 2 Views PA/Lat (19 @ 21:16) >  ******PRELIMINARY REPORT******              INTERPRETATION:  subdiaphragmatic free air.     clear lungs     d/w Dr. Montiel    follow up official report in AM              ******PRELIMINARY REPORT******    ******PRELIMINARY REPORT******          BRIE CLAROS M.D., RADIOLOGY RESIDENT    < end of copied text >

## 2019-11-23 NOTE — BRIEF OPERATIVE NOTE - OPERATION/FINDINGS
Patient s/p colonoscopy with polypectomy earlier today found to have perforated viscus based on history/exam/scan. Taken for exlap, found to have turbid ascites and hyperemic small bowel (entire small bowel affected). Perforation found on anterior aspect of distal sigmoid colon. We excised affected segment of colon using blue-load staplers and matured an end colostomy, which was viable. Peritoneal cavity washed out with liters of warm NS.

## 2019-11-23 NOTE — ED ADULT NURSE REASSESSMENT NOTE - NS ED NURSE REASSESS COMMENT FT1
Nichols catheter placed using sterile technique. Second RN present to confirm sterility. Draining to gravity. Secured w/ stat lock. Initial output off approx. 400cc's urine. Patient tolerated procedure well. Will cont to monitor.

## 2019-11-23 NOTE — BRIEF OPERATIVE NOTE - NSICDXBRIEFPOSTOP_GEN_ALL_CORE_FT
POST-OP DIAGNOSIS:  Perforation of colon as colonoscopy complication 23-Nov-2019 08:05:29  Malcolm Boyce

## 2019-11-23 NOTE — PRE-ANESTHESIA EVALUATION ADULT - NSANTHPMHFT_GEN_ALL_CORE
70 y/o F with no reported PMH , s/p colonoscopy  today c/o sever pain w n/v abd pain. Passing gas and stool. No blood in stool or vomitus. Was sent in by GI due to amount pain.  cxray subdiaphragmatic free air 70 y/o F with no reported PMH , s/p colonoscopy  today c/o sever pain w n/v abd pain. Passing gas and stool. No blood in stool or vomitus. Was sent in by GI due to amount pain.  cxray subdiaphragmatic free air  h/o cholecystectomy and thyroidectomy

## 2019-11-24 LAB
ANION GAP SERPL CALC-SCNC: 11 MMOL/L — SIGNIFICANT CHANGE UP (ref 5–17)
BUN SERPL-MCNC: 10 MG/DL — SIGNIFICANT CHANGE UP (ref 7–23)
CALCIUM SERPL-MCNC: 7.8 MG/DL — LOW (ref 8.4–10.5)
CHLORIDE SERPL-SCNC: 105 MMOL/L — SIGNIFICANT CHANGE UP (ref 96–108)
CO2 SERPL-SCNC: 24 MMOL/L — SIGNIFICANT CHANGE UP (ref 22–31)
CREAT SERPL-MCNC: 0.85 MG/DL — SIGNIFICANT CHANGE UP (ref 0.5–1.3)
CULTURE RESULTS: SIGNIFICANT CHANGE UP
GLUCOSE SERPL-MCNC: 141 MG/DL — HIGH (ref 70–99)
HCT VFR BLD CALC: 40.5 % — SIGNIFICANT CHANGE UP (ref 34.5–45)
HCV AB S/CO SERPL IA: 0.1 S/CO — SIGNIFICANT CHANGE UP (ref 0–0.99)
HCV AB SERPL-IMP: SIGNIFICANT CHANGE UP
HGB BLD-MCNC: 13 G/DL — SIGNIFICANT CHANGE UP (ref 11.5–15.5)
MAGNESIUM SERPL-MCNC: 2.4 MG/DL — SIGNIFICANT CHANGE UP (ref 1.6–2.6)
MCHC RBC-ENTMCNC: 30.4 PG — SIGNIFICANT CHANGE UP (ref 27–34)
MCHC RBC-ENTMCNC: 32.1 GM/DL — SIGNIFICANT CHANGE UP (ref 32–36)
MCV RBC AUTO: 94.6 FL — SIGNIFICANT CHANGE UP (ref 80–100)
NRBC # BLD: 0 /100 WBCS — SIGNIFICANT CHANGE UP (ref 0–0)
PHOSPHATE SERPL-MCNC: 1.7 MG/DL — LOW (ref 2.5–4.5)
PHOSPHATE SERPL-MCNC: 3.9 MG/DL — SIGNIFICANT CHANGE UP (ref 2.5–4.5)
PLATELET # BLD AUTO: 89 K/UL — LOW (ref 150–400)
POTASSIUM SERPL-MCNC: 4.1 MMOL/L — SIGNIFICANT CHANGE UP (ref 3.5–5.3)
POTASSIUM SERPL-SCNC: 4.1 MMOL/L — SIGNIFICANT CHANGE UP (ref 3.5–5.3)
RBC # BLD: 4.28 M/UL — SIGNIFICANT CHANGE UP (ref 3.8–5.2)
RBC # FLD: 14 % — SIGNIFICANT CHANGE UP (ref 10.3–14.5)
SODIUM SERPL-SCNC: 140 MMOL/L — SIGNIFICANT CHANGE UP (ref 135–145)
SPECIMEN SOURCE: SIGNIFICANT CHANGE UP
WBC # BLD: 8.24 K/UL — SIGNIFICANT CHANGE UP (ref 3.8–10.5)
WBC # FLD AUTO: 8.24 K/UL — SIGNIFICANT CHANGE UP (ref 3.8–10.5)

## 2019-11-24 RX ORDER — ACETAMINOPHEN 500 MG
1050 TABLET ORAL EVERY 6 HOURS
Refills: 0 | Status: DISCONTINUED | OUTPATIENT
Start: 2019-11-24 | End: 2019-11-24

## 2019-11-24 RX ORDER — ESCITALOPRAM OXALATE 10 MG/1
10 TABLET, FILM COATED ORAL AT BEDTIME
Refills: 0 | Status: DISCONTINUED | OUTPATIENT
Start: 2019-11-24 | End: 2019-11-27

## 2019-11-24 RX ORDER — ACETAMINOPHEN 500 MG
1000 TABLET ORAL EVERY 6 HOURS
Refills: 0 | Status: COMPLETED | OUTPATIENT
Start: 2019-11-24 | End: 2019-11-25

## 2019-11-24 RX ADMIN — Medication 400 MILLIGRAM(S): at 12:43

## 2019-11-24 RX ADMIN — ENOXAPARIN SODIUM 40 MILLIGRAM(S): 100 INJECTION SUBCUTANEOUS at 12:46

## 2019-11-24 RX ADMIN — HYDROMORPHONE HYDROCHLORIDE 30 MILLILITER(S): 2 INJECTION INTRAMUSCULAR; INTRAVENOUS; SUBCUTANEOUS at 07:38

## 2019-11-24 RX ADMIN — PIPERACILLIN AND TAZOBACTAM 25 GRAM(S): 4; .5 INJECTION, POWDER, LYOPHILIZED, FOR SOLUTION INTRAVENOUS at 21:35

## 2019-11-24 RX ADMIN — HYDROMORPHONE HYDROCHLORIDE 30 MILLILITER(S): 2 INJECTION INTRAMUSCULAR; INTRAVENOUS; SUBCUTANEOUS at 19:43

## 2019-11-24 RX ADMIN — Medication 1000 MILLIGRAM(S): at 13:10

## 2019-11-24 RX ADMIN — ONDANSETRON 4 MILLIGRAM(S): 8 TABLET, FILM COATED ORAL at 17:55

## 2019-11-24 RX ADMIN — PIPERACILLIN AND TAZOBACTAM 25 GRAM(S): 4; .5 INJECTION, POWDER, LYOPHILIZED, FOR SOLUTION INTRAVENOUS at 05:26

## 2019-11-24 RX ADMIN — PIPERACILLIN AND TAZOBACTAM 25 GRAM(S): 4; .5 INJECTION, POWDER, LYOPHILIZED, FOR SOLUTION INTRAVENOUS at 13:23

## 2019-11-24 RX ADMIN — Medication 85 MILLIMOLE(S): at 15:44

## 2019-11-24 RX ADMIN — ESCITALOPRAM OXALATE 10 MILLIGRAM(S): 10 TABLET, FILM COATED ORAL at 21:35

## 2019-11-24 RX ADMIN — Medication 400 MILLIGRAM(S): at 18:00

## 2019-11-24 NOTE — PROGRESS NOTE ADULT - ATTENDING COMMENTS
Patient seen and examined with house staff  Doing well  vitals stable  afebrile   ostomy viable    - awaiting return of GI function  - post op antibiotics x 5 days for colonic perforation

## 2019-11-24 NOTE — PHYSICAL THERAPY INITIAL EVALUATION ADULT - ADDITIONAL COMMENTS
As per pt, pt lives in a private house w/ spouse and son and 7 steps to enter w/ UHR. PTA pt was independent w/ all mobility and ADLs.

## 2019-11-24 NOTE — PHYSICAL THERAPY INITIAL EVALUATION ADULT - PERTINENT HX OF CURRENT PROBLEM, REHAB EVAL
Pt is a 69F with no significant pmh presenting with abdominal pain. Per GI physician (Dr. Simental), patient underwent sigmoid polypectomy and multiple biopsies. In ED, patient had CXR which demonstrated significant subdiaphragmatic air.  CT Abdomen: Large pneumoperitoneum and small free fluid secondary to colonic perforation along the mid to distal sigmoid colon. Now s/p Isaias colectomy on 11/23.

## 2019-11-24 NOTE — PHYSICAL THERAPY INITIAL EVALUATION ADULT - PLANNED THERAPY INTERVENTIONS, PT EVAL
GOAL: Pt will perform 7 stairs with or without U HR as needed within 3-4weeks./balance training/bed mobility training/gait training/transfer training

## 2019-11-25 DIAGNOSIS — R19.8 OTHER SPECIFIED SYMPTOMS AND SIGNS INVOLVING THE DIGESTIVE SYSTEM AND ABDOMEN: ICD-10-CM

## 2019-11-25 DIAGNOSIS — R11.0 NAUSEA: ICD-10-CM

## 2019-11-25 DIAGNOSIS — Z79.899 OTHER LONG TERM (CURRENT) DRUG THERAPY: ICD-10-CM

## 2019-11-25 DIAGNOSIS — Z02.9 ENCOUNTER FOR ADMINISTRATIVE EXAMINATIONS, UNSPECIFIED: ICD-10-CM

## 2019-11-25 DIAGNOSIS — F41.9 ANXIETY DISORDER, UNSPECIFIED: ICD-10-CM

## 2019-11-25 LAB
ANION GAP SERPL CALC-SCNC: 10 MMOL/L — SIGNIFICANT CHANGE UP (ref 5–17)
BUN SERPL-MCNC: 11 MG/DL — SIGNIFICANT CHANGE UP (ref 7–23)
CALCIUM SERPL-MCNC: 7.8 MG/DL — LOW (ref 8.4–10.5)
CHLORIDE SERPL-SCNC: 106 MMOL/L — SIGNIFICANT CHANGE UP (ref 96–108)
CO2 SERPL-SCNC: 23 MMOL/L — SIGNIFICANT CHANGE UP (ref 22–31)
CREAT SERPL-MCNC: 0.75 MG/DL — SIGNIFICANT CHANGE UP (ref 0.5–1.3)
GLUCOSE SERPL-MCNC: 98 MG/DL — SIGNIFICANT CHANGE UP (ref 70–99)
HCT VFR BLD CALC: 35 % — SIGNIFICANT CHANGE UP (ref 34.5–45)
HGB BLD-MCNC: 11.5 G/DL — SIGNIFICANT CHANGE UP (ref 11.5–15.5)
MAGNESIUM SERPL-MCNC: 2.1 MG/DL — SIGNIFICANT CHANGE UP (ref 1.6–2.6)
MCHC RBC-ENTMCNC: 29.7 PG — SIGNIFICANT CHANGE UP (ref 27–34)
MCHC RBC-ENTMCNC: 32.9 GM/DL — SIGNIFICANT CHANGE UP (ref 32–36)
MCV RBC AUTO: 90.4 FL — SIGNIFICANT CHANGE UP (ref 80–100)
NRBC # BLD: 0 /100 WBCS — SIGNIFICANT CHANGE UP (ref 0–0)
PHOSPHATE SERPL-MCNC: 2.5 MG/DL — SIGNIFICANT CHANGE UP (ref 2.5–4.5)
PLATELET # BLD AUTO: 134 K/UL — LOW (ref 150–400)
POTASSIUM SERPL-MCNC: 4 MMOL/L — SIGNIFICANT CHANGE UP (ref 3.5–5.3)
POTASSIUM SERPL-SCNC: 4 MMOL/L — SIGNIFICANT CHANGE UP (ref 3.5–5.3)
RBC # BLD: 3.87 M/UL — SIGNIFICANT CHANGE UP (ref 3.8–5.2)
RBC # FLD: 13.8 % — SIGNIFICANT CHANGE UP (ref 10.3–14.5)
SODIUM SERPL-SCNC: 139 MMOL/L — SIGNIFICANT CHANGE UP (ref 135–145)
WBC # BLD: 9.11 K/UL — SIGNIFICANT CHANGE UP (ref 3.8–10.5)
WBC # FLD AUTO: 9.11 K/UL — SIGNIFICANT CHANGE UP (ref 3.8–10.5)

## 2019-11-25 PROCEDURE — 99223 1ST HOSP IP/OBS HIGH 75: CPT

## 2019-11-25 PROCEDURE — 74018 RADEX ABDOMEN 1 VIEW: CPT | Mod: 26

## 2019-11-25 RX ORDER — METOCLOPRAMIDE HCL 10 MG
10 TABLET ORAL ONCE
Refills: 0 | Status: COMPLETED | OUTPATIENT
Start: 2019-11-25 | End: 2019-11-25

## 2019-11-25 RX ORDER — ACETAMINOPHEN 500 MG
1000 TABLET ORAL ONCE
Refills: 0 | Status: COMPLETED | OUTPATIENT
Start: 2019-11-25 | End: 2019-11-25

## 2019-11-25 RX ORDER — MORPHINE SULFATE 50 MG/1
2 CAPSULE, EXTENDED RELEASE ORAL EVERY 4 HOURS
Refills: 0 | Status: DISCONTINUED | OUTPATIENT
Start: 2019-11-25 | End: 2019-11-27

## 2019-11-25 RX ORDER — ONDANSETRON 8 MG/1
8 TABLET, FILM COATED ORAL EVERY 8 HOURS
Refills: 0 | Status: DISCONTINUED | OUTPATIENT
Start: 2019-11-25 | End: 2019-11-27

## 2019-11-25 RX ORDER — HYDROMORPHONE HYDROCHLORIDE 2 MG/ML
30 INJECTION INTRAMUSCULAR; INTRAVENOUS; SUBCUTANEOUS
Refills: 0 | Status: DISCONTINUED | OUTPATIENT
Start: 2019-11-25 | End: 2019-11-25

## 2019-11-25 RX ORDER — ONDANSETRON 8 MG/1
4 TABLET, FILM COATED ORAL ONCE
Refills: 0 | Status: COMPLETED | OUTPATIENT
Start: 2019-11-25 | End: 2019-11-25

## 2019-11-25 RX ADMIN — Medication 10 MILLIGRAM(S): at 21:03

## 2019-11-25 RX ADMIN — Medication 0.5 MILLIGRAM(S): at 16:38

## 2019-11-25 RX ADMIN — ESCITALOPRAM OXALATE 10 MILLIGRAM(S): 10 TABLET, FILM COATED ORAL at 21:03

## 2019-11-25 RX ADMIN — ONDANSETRON 4 MILLIGRAM(S): 8 TABLET, FILM COATED ORAL at 06:36

## 2019-11-25 RX ADMIN — Medication 0.5 MILLIGRAM(S): at 23:17

## 2019-11-25 RX ADMIN — Medication 400 MILLIGRAM(S): at 11:27

## 2019-11-25 RX ADMIN — DEXTROSE MONOHYDRATE, SODIUM CHLORIDE, AND POTASSIUM CHLORIDE 75 MILLILITER(S): 50; .745; 4.5 INJECTION, SOLUTION INTRAVENOUS at 00:02

## 2019-11-25 RX ADMIN — Medication 10 MILLIGRAM(S): at 15:08

## 2019-11-25 RX ADMIN — PIPERACILLIN AND TAZOBACTAM 25 GRAM(S): 4; .5 INJECTION, POWDER, LYOPHILIZED, FOR SOLUTION INTRAVENOUS at 21:03

## 2019-11-25 RX ADMIN — Medication 400 MILLIGRAM(S): at 23:17

## 2019-11-25 RX ADMIN — ONDANSETRON 4 MILLIGRAM(S): 8 TABLET, FILM COATED ORAL at 08:15

## 2019-11-25 RX ADMIN — Medication 400 MILLIGRAM(S): at 06:02

## 2019-11-25 RX ADMIN — HYDROMORPHONE HYDROCHLORIDE 30 MILLILITER(S): 2 INJECTION INTRAMUSCULAR; INTRAVENOUS; SUBCUTANEOUS at 07:31

## 2019-11-25 RX ADMIN — PIPERACILLIN AND TAZOBACTAM 25 GRAM(S): 4; .5 INJECTION, POWDER, LYOPHILIZED, FOR SOLUTION INTRAVENOUS at 06:02

## 2019-11-25 RX ADMIN — ONDANSETRON 8 MILLIGRAM(S): 8 TABLET, FILM COATED ORAL at 12:19

## 2019-11-25 RX ADMIN — Medication 400 MILLIGRAM(S): at 00:03

## 2019-11-25 RX ADMIN — Medication 1000 MILLIGRAM(S): at 19:09

## 2019-11-25 RX ADMIN — PIPERACILLIN AND TAZOBACTAM 25 GRAM(S): 4; .5 INJECTION, POWDER, LYOPHILIZED, FOR SOLUTION INTRAVENOUS at 14:12

## 2019-11-25 RX ADMIN — Medication 1000 MILLIGRAM(S): at 00:25

## 2019-11-25 RX ADMIN — ENOXAPARIN SODIUM 40 MILLIGRAM(S): 100 INJECTION SUBCUTANEOUS at 11:29

## 2019-11-25 RX ADMIN — Medication 400 MILLIGRAM(S): at 18:44

## 2019-11-25 RX ADMIN — Medication 1000 MILLIGRAM(S): at 06:36

## 2019-11-25 RX ADMIN — ONDANSETRON 4 MILLIGRAM(S): 8 TABLET, FILM COATED ORAL at 02:51

## 2019-11-25 NOTE — CONSULT NOTE ADULT - PROBLEM SELECTOR RECOMMENDATION 5
Transitions of Care Status:  1.  Name of PCP: Unk  2.  PCP Contacted on Admission: Unknown  3.  PCP contacted at Discharge: [ ] Y    [ ] N    [ ] N/A  4.  Post-Discharge Appointment Date and Location:  5.  Summary of Handoff given to PCP:

## 2019-11-25 NOTE — CONSULT NOTE ADULT - SUBJECTIVE AND OBJECTIVE BOX
HPI:  [facility] Surgery H&P    Patient is a 69y old  Female who presents with a chief complaint of abdominal pain.    HPI:    69F with no significant pmh presenting with abdominal pain. Patient states she began having diffuse abdominal pain a few hours after screening colonoscopy at 10AM. Per GI physician (Dr. Simental), patient underwent sigmoid polypectomy and multiple biopsies. Patient currently has severe pain with episodes of nausea and vomiting. Passing gas and non-bloody/non-melanotic stool. Patient was sent in by GI due to amount pain. In ED, patient had CXR which demonstrated significant subdiaphragmatic air. Surgery was consulted for evaluation, patient found to have colonic perforation, now s/p OR for sergo's procedure, currently stable on med/surg floor.    At present, patient reports 'nerves' and 'nausea,' with intermittent nbnb vomiting. No fevers. States she takes xanax prn at home.    14 point ROS otherwise negative.     PAST MEDICAL & SURGICAL HISTORY:  No pertinent past medical history  No significant past surgical history      FAMILY HISTORY: noncontributory    SOCIAL HISTORY:  - No smoking or ETOH use  - Lives in private residence  - Active lifestyle    home MEDICATIONS  (STANDING): pt does not know full med lists or pharmacy    inpatient MEDICATIONS  (STANDING):  acetaminophen  IVPB .. 1000 milliGRAM(s) IV Intermittent once  acetaminophen  IVPB .. 1000 milliGRAM(s) IV Intermittent once  dextrose 5% + sodium chloride 0.45% with potassium chloride 20 mEq/L 1000 milliLiter(s) (75 mL/Hr) IV Continuous <Continuous>  enoxaparin Injectable 40 milliGRAM(s) SubCutaneous daily  escitalopram 10 milliGRAM(s) Oral at bedtime  piperacillin/tazobactam IVPB.. 3.375 Gram(s) IV Intermittent every 8 hours    MEDICATIONS  (PRN):  LORazepam   Injectable 0.5 milliGRAM(s) IV Push two times a day PRN Anxiety  morphine  - Injectable 2 milliGRAM(s) IV Push every 4 hours PRN Severe Pain (7 - 10)  ondansetron Injectable 8 milliGRAM(s) IV Push every 8 hours PRN Nausea and/or Vomiting      Vital Signs Last 24 Hrs  T(C): 36.7 (25 Nov 2019 17:17), Max: 36.9 (25 Nov 2019 01:29)  T(F): 98.1 (25 Nov 2019 17:17), Max: 98.5 (25 Nov 2019 01:29)  HR: 99 (25 Nov 2019 17:17) (77 - 99)  BP: 154/82 (25 Nov 2019 17:17) (124/74 - 168/88)  BP(mean): --  RR: 18 (25 Nov 2019 17:17) (17 - 18)  SpO2: 96% (25 Nov 2019 17:17) (95% - 98%)  CAPILLARY BLOOD GLUCOSE    PHYSICAL EXAM:  GENERAL: NAD, well-developed  HEAD:  Atraumatic, Normocephalic  EYES: EOMI, PERRLA, conjunctiva and sclera clear  NECK: Supple, No JVD  CHEST/LUNG: Clear to auscultation bilaterally; No wheeze  HEART: Regular rate and rhythm; No murmurs, rubs, or gallops  ABDOMEN: Soft, +RLQ tenderness, +LLQ ostomy with soft brown stool, +midline abd scar dressed, Nondistended; Bowel sounds present  EXTREMITIES:  2+ Peripheral Pulses, No clubbing, cyanosis, or edema  PSYCH: AAOx3, +anxious  NEUROLOGY: non-focal  SKIN: No rashes or lesions    LABS:                        11.5   9.11  )-----------( 134      ( 25 Nov 2019 06:53 )             35.0     11-25    139  |  106  |  11  ----------------------------<  98  4.0   |  23  |  0.75    Ca    7.8<L>      25 Nov 2019 06:53  Phos  2.5     11-25  Mg     2.1     11-25      Culture - Urine (collected 23 Nov 2019 00:26)  Source: .Urine Clean Catch (Midstream)  Final Report (24 Nov 2019 11:39):    Normal Urogenital bridget present        RADIOLOGY & ADDITIONAL TESTS:    < from: CT Abdomen and Pelvis w/ IV Cont (11.22.19 @ 23:46) >  Large pneumoperitoneum and small free fluid secondary to colonic   perforation along the mid to distal sigmoid colon.    < end of copied text >      Consultant(s) Notes Reviewed:  surgery    Care Discussed with Consultants/Other Providers: surgery team

## 2019-11-25 NOTE — CONSULT NOTE ADULT - PROBLEM SELECTOR RECOMMENDATION 9
reportedly s/p screening colonoscopy. Now s/p sergo's procedure  - continue monitoring ostomy output  - pain control and care as per surgery  - continue zosyn

## 2019-11-25 NOTE — CONSULT NOTE ADULT - PROBLEM SELECTOR RECOMMENDATION 2
Pt reportedly takes xanax prn at home, unable to provide further dosing info and prescription not appearing on iSTOP.  - pt exhibiting considerable anxiety at present, will start ativan 0.5mg iv bid prn anxiety so as not to precipitate any withdrawal, and due to its effect on nausea  - attempted to reach PCP however patient unable to provide name/number, called both sons to provide information, awaiting call back. Pt reports PCP listed in EMR is her GI.

## 2019-11-25 NOTE — CONSULT NOTE ADULT - PROBLEM SELECTOR RECOMMENDATION 3
Likely 2/2 perforated viscus and peritonitis  - plan as above  - continue zofran prn, reglan  - may trial low dose ativan as above if refractory

## 2019-11-25 NOTE — PROGRESS NOTE ADULT - ATTENDING COMMENTS
Pt seen and examined.  Chart reviewed.  Resident note confirmed.  Pt is a 69 year old female s/p colonoscopic perforation.  She returned to the hospital after colonoscopy with abdominal pain/peritonitis  Pt underwent Isaias's procedure.  No acute events overnight.  +nausea  Continue NPO   Await bowel fcn  Replace NGT for vomiting  continue supportive care.

## 2019-11-25 NOTE — CONSULT NOTE ADULT - PROBLEM SELECTOR RECOMMENDATION 4
Awaiting collateral information from family re: home medications and pharmacy, as well as details regarding benzodiazepine use, as this is not listed in Istop.

## 2019-11-25 NOTE — CONSULT NOTE ADULT - ASSESSMENT
69f hx anxiety presenting with colonic perforation reportedly after recent screening colonoscopy with polypectomy, now s/p sergo's procedure with ongoing nausea and anxiety

## 2019-11-26 LAB
ANION GAP SERPL CALC-SCNC: 14 MMOL/L — SIGNIFICANT CHANGE UP (ref 5–17)
BUN SERPL-MCNC: 10 MG/DL — SIGNIFICANT CHANGE UP (ref 7–23)
CALCIUM SERPL-MCNC: 8.2 MG/DL — LOW (ref 8.4–10.5)
CHLORIDE SERPL-SCNC: 104 MMOL/L — SIGNIFICANT CHANGE UP (ref 96–108)
CO2 SERPL-SCNC: 22 MMOL/L — SIGNIFICANT CHANGE UP (ref 22–31)
CREAT SERPL-MCNC: 0.76 MG/DL — SIGNIFICANT CHANGE UP (ref 0.5–1.3)
GLUCOSE SERPL-MCNC: 101 MG/DL — HIGH (ref 70–99)
HCT VFR BLD CALC: 36.2 % — SIGNIFICANT CHANGE UP (ref 34.5–45)
HGB BLD-MCNC: 11.7 G/DL — SIGNIFICANT CHANGE UP (ref 11.5–15.5)
MAGNESIUM SERPL-MCNC: 2 MG/DL — SIGNIFICANT CHANGE UP (ref 1.6–2.6)
MCHC RBC-ENTMCNC: 29.3 PG — SIGNIFICANT CHANGE UP (ref 27–34)
MCHC RBC-ENTMCNC: 32.3 GM/DL — SIGNIFICANT CHANGE UP (ref 32–36)
MCV RBC AUTO: 90.5 FL — SIGNIFICANT CHANGE UP (ref 80–100)
NRBC # BLD: 0 /100 WBCS — SIGNIFICANT CHANGE UP (ref 0–0)
PHOSPHATE SERPL-MCNC: 2.1 MG/DL — LOW (ref 2.5–4.5)
PLATELET # BLD AUTO: 183 K/UL — SIGNIFICANT CHANGE UP (ref 150–400)
POTASSIUM SERPL-MCNC: 4 MMOL/L — SIGNIFICANT CHANGE UP (ref 3.5–5.3)
POTASSIUM SERPL-SCNC: 4 MMOL/L — SIGNIFICANT CHANGE UP (ref 3.5–5.3)
RBC # BLD: 4 M/UL — SIGNIFICANT CHANGE UP (ref 3.8–5.2)
RBC # FLD: 13.6 % — SIGNIFICANT CHANGE UP (ref 10.3–14.5)
SODIUM SERPL-SCNC: 140 MMOL/L — SIGNIFICANT CHANGE UP (ref 135–145)
WBC # BLD: 8.19 K/UL — SIGNIFICANT CHANGE UP (ref 3.8–10.5)
WBC # FLD AUTO: 8.19 K/UL — SIGNIFICANT CHANGE UP (ref 3.8–10.5)

## 2019-11-26 PROCEDURE — 74018 RADEX ABDOMEN 1 VIEW: CPT | Mod: 26

## 2019-11-26 PROCEDURE — 99233 SBSQ HOSP IP/OBS HIGH 50: CPT

## 2019-11-26 PROCEDURE — 93010 ELECTROCARDIOGRAM REPORT: CPT | Mod: 76

## 2019-11-26 RX ORDER — PANTOPRAZOLE SODIUM 20 MG/1
40 TABLET, DELAYED RELEASE ORAL DAILY
Refills: 0 | Status: DISCONTINUED | OUTPATIENT
Start: 2019-11-26 | End: 2019-11-27

## 2019-11-26 RX ORDER — POTASSIUM PHOSPHATE, MONOBASIC POTASSIUM PHOSPHATE, DIBASIC 236; 224 MG/ML; MG/ML
15 INJECTION, SOLUTION INTRAVENOUS ONCE
Refills: 0 | Status: COMPLETED | OUTPATIENT
Start: 2019-11-26 | End: 2019-11-26

## 2019-11-26 RX ORDER — METOCLOPRAMIDE HCL 10 MG
5 TABLET ORAL ONCE
Refills: 0 | Status: COMPLETED | OUTPATIENT
Start: 2019-11-26 | End: 2019-11-26

## 2019-11-26 RX ORDER — ACETAMINOPHEN 500 MG
1000 TABLET ORAL ONCE
Refills: 0 | Status: COMPLETED | OUTPATIENT
Start: 2019-11-26 | End: 2019-11-26

## 2019-11-26 RX ORDER — METOCLOPRAMIDE HCL 10 MG
10 TABLET ORAL ONCE
Refills: 0 | Status: COMPLETED | OUTPATIENT
Start: 2019-11-26 | End: 2019-11-26

## 2019-11-26 RX ADMIN — ONDANSETRON 8 MILLIGRAM(S): 8 TABLET, FILM COATED ORAL at 03:57

## 2019-11-26 RX ADMIN — POTASSIUM PHOSPHATE, MONOBASIC POTASSIUM PHOSPHATE, DIBASIC 62.5 MILLIMOLE(S): 236; 224 INJECTION, SOLUTION INTRAVENOUS at 13:14

## 2019-11-26 RX ADMIN — Medication 1000 MILLIGRAM(S): at 00:33

## 2019-11-26 RX ADMIN — Medication 10 MILLIGRAM(S): at 08:49

## 2019-11-26 RX ADMIN — ENOXAPARIN SODIUM 40 MILLIGRAM(S): 100 INJECTION SUBCUTANEOUS at 13:14

## 2019-11-26 RX ADMIN — ONDANSETRON 8 MILLIGRAM(S): 8 TABLET, FILM COATED ORAL at 13:17

## 2019-11-26 RX ADMIN — Medication 1000 MILLIGRAM(S): at 09:19

## 2019-11-26 RX ADMIN — ONDANSETRON 8 MILLIGRAM(S): 8 TABLET, FILM COATED ORAL at 21:55

## 2019-11-26 RX ADMIN — PIPERACILLIN AND TAZOBACTAM 25 GRAM(S): 4; .5 INJECTION, POWDER, LYOPHILIZED, FOR SOLUTION INTRAVENOUS at 21:55

## 2019-11-26 RX ADMIN — Medication 400 MILLIGRAM(S): at 08:49

## 2019-11-26 RX ADMIN — Medication 400 MILLIGRAM(S): at 22:29

## 2019-11-26 RX ADMIN — Medication 1000 MILLIGRAM(S): at 23:00

## 2019-11-26 RX ADMIN — PIPERACILLIN AND TAZOBACTAM 25 GRAM(S): 4; .5 INJECTION, POWDER, LYOPHILIZED, FOR SOLUTION INTRAVENOUS at 05:09

## 2019-11-26 RX ADMIN — ESCITALOPRAM OXALATE 10 MILLIGRAM(S): 10 TABLET, FILM COATED ORAL at 21:54

## 2019-11-26 RX ADMIN — PIPERACILLIN AND TAZOBACTAM 25 GRAM(S): 4; .5 INJECTION, POWDER, LYOPHILIZED, FOR SOLUTION INTRAVENOUS at 13:22

## 2019-11-26 RX ADMIN — PANTOPRAZOLE SODIUM 40 MILLIGRAM(S): 20 TABLET, DELAYED RELEASE ORAL at 13:22

## 2019-11-26 RX ADMIN — Medication 5 MILLIGRAM(S): at 20:26

## 2019-11-26 RX ADMIN — Medication 0.5 MILLIGRAM(S): at 23:31

## 2019-11-26 NOTE — DIETITIAN INITIAL EVALUATION ADULT. - PERTINENT MEDS FT
MEDICATIONS  (STANDING):  dextrose 5% + sodium chloride 0.45% with potassium chloride 20 mEq/L 1000 milliLiter(s) (75 mL/Hr) IV Continuous <Continuous>  enoxaparin Injectable 40 milliGRAM(s) SubCutaneous daily  escitalopram 10 milliGRAM(s) Oral at bedtime  pantoprazole  Injectable 40 milliGRAM(s) IV Push daily  piperacillin/tazobactam IVPB.. 3.375 Gram(s) IV Intermittent every 8 hours  potassium phosphate IVPB 15 milliMole(s) IV Intermittent once    MEDICATIONS  (PRN):  acetaminophen  IVPB .. 1000 milliGRAM(s) IV Intermittent once PRN Mild Pain (1 - 3)  LORazepam   Injectable 0.5 milliGRAM(s) IV Push two times a day PRN Anxiety  morphine  - Injectable 2 milliGRAM(s) IV Push every 4 hours PRN Severe Pain (7 - 10)  ondansetron Injectable 8 milliGRAM(s) IV Push every 8 hours PRN Nausea and/or Vomiting

## 2019-11-26 NOTE — DIETITIAN INITIAL EVALUATION ADULT. - ADD RECOMMEND
NPO to clears as appropriate, consider Ensure Clears x3 to supplement, multivitamin, Monitor weight trends, labs, and skin integrity

## 2019-11-26 NOTE — DIETITIAN INITIAL EVALUATION ADULT. - OTHER INFO
Patient found in chair with daughter in law (DIL) at bedside.  Patient currently NPO and experiencing nausea.  Very uncomfortable.  As per DIL, patient was perfectly fine at home.  Ate well, did own cooking, well nourished.  Current situation is acute onset after colonoscopy procedure.    s/p Isaias after bowel perforation from screening colonoscopy. Weight mostly stable at 153 pounds.  Supplements with probiotics. Currently NPO x 3 days with healing process.

## 2019-11-26 NOTE — PROGRESS NOTE ADULT - ATTENDING COMMENTS
Pt seen and examined.  Chart reviewed.  Resident note confirmed.  Pt is a 69 year old female s/p colonoscopic perforation.  She returned to the hospital after colonoscopy with abdominal pain/peritonitis  Pt underwent Isaias's procedure.  No acute events overnight.  +nausea  Continue NPO   CT abdomen with oral contrast  Await bowel fcn  Replace NGT for vomiting  continue supportive care. Pt seen and examined.  Chart reviewed.  Resident note confirmed.  Pt is a 69 year old female s/p colonoscopic perforation.  She returned to the hospital after colonoscopy with abdominal pain/peritonitis  Pt underwent Isaias's procedure.  No acute events overnight.  +nausea  Continue NPO   AXR reviewed  Await bowel fcn  Replace NGT for vomiting  continue supportive care.

## 2019-11-27 ENCOUNTER — TRANSCRIPTION ENCOUNTER (OUTPATIENT)
Age: 69
End: 2019-11-27

## 2019-11-27 VITALS
HEART RATE: 82 BPM | OXYGEN SATURATION: 98 % | TEMPERATURE: 98 F | WEIGHT: 159.39 LBS | SYSTOLIC BLOOD PRESSURE: 156 MMHG | RESPIRATION RATE: 18 BRPM | DIASTOLIC BLOOD PRESSURE: 83 MMHG

## 2019-11-27 LAB
ANION GAP SERPL CALC-SCNC: 11 MMOL/L — SIGNIFICANT CHANGE UP (ref 5–17)
BUN SERPL-MCNC: 8 MG/DL — SIGNIFICANT CHANGE UP (ref 7–23)
CALCIUM SERPL-MCNC: 8.5 MG/DL — SIGNIFICANT CHANGE UP (ref 8.4–10.5)
CHLORIDE SERPL-SCNC: 105 MMOL/L — SIGNIFICANT CHANGE UP (ref 96–108)
CO2 SERPL-SCNC: 23 MMOL/L — SIGNIFICANT CHANGE UP (ref 22–31)
CREAT SERPL-MCNC: 0.78 MG/DL — SIGNIFICANT CHANGE UP (ref 0.5–1.3)
GLUCOSE SERPL-MCNC: 127 MG/DL — HIGH (ref 70–99)
HCT VFR BLD CALC: 35.3 % — SIGNIFICANT CHANGE UP (ref 34.5–45)
HGB BLD-MCNC: 11.4 G/DL — LOW (ref 11.5–15.5)
MAGNESIUM SERPL-MCNC: 1.9 MG/DL — SIGNIFICANT CHANGE UP (ref 1.6–2.6)
MCHC RBC-ENTMCNC: 29.2 PG — SIGNIFICANT CHANGE UP (ref 27–34)
MCHC RBC-ENTMCNC: 32.3 GM/DL — SIGNIFICANT CHANGE UP (ref 32–36)
MCV RBC AUTO: 90.5 FL — SIGNIFICANT CHANGE UP (ref 80–100)
NRBC # BLD: 0 /100 WBCS — SIGNIFICANT CHANGE UP (ref 0–0)
PHOSPHATE SERPL-MCNC: 2.7 MG/DL — SIGNIFICANT CHANGE UP (ref 2.5–4.5)
PLATELET # BLD AUTO: 198 K/UL — SIGNIFICANT CHANGE UP (ref 150–400)
POTASSIUM SERPL-MCNC: 3.8 MMOL/L — SIGNIFICANT CHANGE UP (ref 3.5–5.3)
POTASSIUM SERPL-SCNC: 3.8 MMOL/L — SIGNIFICANT CHANGE UP (ref 3.5–5.3)
RBC # BLD: 3.9 M/UL — SIGNIFICANT CHANGE UP (ref 3.8–5.2)
RBC # FLD: 13.9 % — SIGNIFICANT CHANGE UP (ref 10.3–14.5)
SODIUM SERPL-SCNC: 139 MMOL/L — SIGNIFICANT CHANGE UP (ref 135–145)
WBC # BLD: 6.11 K/UL — SIGNIFICANT CHANGE UP (ref 3.8–10.5)
WBC # FLD AUTO: 6.11 K/UL — SIGNIFICANT CHANGE UP (ref 3.8–10.5)

## 2019-11-27 PROCEDURE — 99232 SBSQ HOSP IP/OBS MODERATE 35: CPT

## 2019-11-27 RX ORDER — OXYCODONE HYDROCHLORIDE 5 MG/1
5 TABLET ORAL EVERY 4 HOURS
Refills: 0 | Status: DISCONTINUED | OUTPATIENT
Start: 2019-11-27 | End: 2019-11-27

## 2019-11-27 RX ORDER — POTASSIUM PHOSPHATE, MONOBASIC POTASSIUM PHOSPHATE, DIBASIC 236; 224 MG/ML; MG/ML
15 INJECTION, SOLUTION INTRAVENOUS ONCE
Refills: 0 | Status: DISCONTINUED | OUTPATIENT
Start: 2019-11-27 | End: 2019-11-27

## 2019-11-27 RX ORDER — OXYCODONE HYDROCHLORIDE 5 MG/1
1 TABLET ORAL
Qty: 12 | Refills: 0
Start: 2019-11-27

## 2019-11-27 RX ORDER — MAGNESIUM SULFATE 500 MG/ML
2 VIAL (ML) INJECTION ONCE
Refills: 0 | Status: DISCONTINUED | OUTPATIENT
Start: 2019-11-27 | End: 2019-11-27

## 2019-11-27 RX ORDER — ACETAMINOPHEN 500 MG
2 TABLET ORAL
Qty: 0 | Refills: 0 | DISCHARGE
Start: 2019-11-27

## 2019-11-27 RX ORDER — ACETAMINOPHEN 500 MG
650 TABLET ORAL EVERY 6 HOURS
Refills: 0 | Status: DISCONTINUED | OUTPATIENT
Start: 2019-11-27 | End: 2019-11-27

## 2019-11-27 RX ORDER — ESCITALOPRAM OXALATE 10 MG/1
1 TABLET, FILM COATED ORAL
Qty: 0 | Refills: 0 | DISCHARGE
Start: 2019-11-27

## 2019-11-27 RX ORDER — LANOLIN ALCOHOL/MO/W.PET/CERES
3 CREAM (GRAM) TOPICAL ONCE
Refills: 0 | Status: DISCONTINUED | OUTPATIENT
Start: 2019-11-27 | End: 2019-11-27

## 2019-11-27 RX ADMIN — PIPERACILLIN AND TAZOBACTAM 25 GRAM(S): 4; .5 INJECTION, POWDER, LYOPHILIZED, FOR SOLUTION INTRAVENOUS at 05:23

## 2019-11-27 RX ADMIN — ONDANSETRON 8 MILLIGRAM(S): 8 TABLET, FILM COATED ORAL at 07:04

## 2019-11-27 NOTE — DISCHARGE NOTE PROVIDER - HOSPITAL COURSE
69F with no significant pmh presenting with abdominal pain. Patient states she began having diffuse abdominal pain a few hours after screening colonoscopy at 10AM. Per GI physician (Dr. Simental), patient underwent sigmoid polypectomy and multiple biopsies. Severe pain with episodes of nausea and vomiting in ED. Passing gas and non-bloody/non-melanotic stool. Patient was sent in by GI due to amount pain. Had CXR which demonstrated significant subdiaphragmatic air. Surgery was consulted for evaluation. Patient admitted and underwent Isaias colectomy for perforation of colon as colonoscopy complication on 11/23.        During recovery on floor, patient received ostomy training. She also experienced nausea/hunger pains. Nausea was minimally relieved with Zofran however improved as she began to tolerate advancement in her diet.

## 2019-11-27 NOTE — DISCHARGE NOTE PROVIDER - HOSPITAL COURSE
69F with no significant pmh presenting with abdominal pain. Patient states she began having diffuse abdominal pain a few hours after screening colonoscopy at 10AM. Per GI physician (Dr. Simental), patient underwent sigmoid polypectomy and multiple biopsies. Severe pain with episodes of nausea and vomiting in ED. Passing gas and non-bloody/non-melanotic stool. Patient was sent in by GI due to amount pain. Had CXR which demonstrated significant subdiaphragmatic air. Surgery was consulted for evaluation. Patient admitted and underwent Isaias colectomy for perforation of colon as colonoscopy complication on 11/23.        During recovery on floor, patient received ostomy training. She also experienced nausea/hunger pains. Nausea was minimally relieved with Zofran however improved as she began to tolerate advancement in her diet. 69F with no significant pmh presenting with abdominal pain. Patient states she began having diffuse abdominal pain a few hours after screening colonoscopy at 10AM. Per GI physician (Dr. Simental), patient underwent sigmoid polypectomy and multiple biopsies. Severe pain with episodes of nausea and vomiting in ED. Passing gas and non-bloody/non-melanotic stool. Patient was sent in by GI due to amount pain. Had CXR which demonstrated significant subdiaphragmatic air. Surgery was consulted for evaluation. Patient admitted and underwent Isaias colectomy for perforation of colon as colonoscopy complication on 11/23. PAtient tolerated procedure well, was extubated and sent to floor stable.          During recovery on floor, patient received ostomy training. She also experienced nausea/hunger pains. Nausea was minimally relieved with Zofran however improved as she began to tolerate advancement in her diet. PT eval: no needs.  At the time of discharge, the patient was hemodynamically stable, was tolerating PO diet, was voiding urine and passing stool, was ambulating, and was comfortable with adequate pain control. The patient was instructed to follow up with Dr. Zepeda within 1-2 weeks after discharge from the hospital. The patient felt comfortable with discharge. The patient was discharged to home with vns for ostomy. The patient had no other issues. 69F with no significant pmh presenting with abdominal pain. Patient states she began having diffuse abdominal pain a few hours after screening colonoscopy at 10AM. Per GI physician (Dr. Simental), patient underwent sigmoid polypectomy and multiple biopsies. Severe pain with episodes of nausea and vomiting in ED. Passing gas and non-bloody/non-melanotic stool. Patient was sent in by GI due to amount pain. Had CXR which demonstrated significant subdiaphragmatic air. Surgery was consulted for evaluation. Patient admitted and underwent Isaias colectomy for perforation of colon as colonoscopy complication on 11/23. PAtient tolerated procedure well, was extubated and sent to floor stable.          During recovery on floor, patient received ostomy training. She also experienced nausea/hunger pains. Nausea was minimally relieved with Zofran however improved as she began to tolerate advancement in her diet. PT eval: no needs. Hosptialist following. POD1,  D/C interiano, passed TOV, NGT removed this morning. POD2, diet adv to clears. POD3 fabiana dc.  At the time of discharge, the patient was hemodynamically stable, was tolerating PO diet, was voiding urine and passing stool, was ambulating, and was comfortable with adequate pain control. The patient was instructed to follow up with Dr. Zepeda within 1-2 weeks after discharge from the hospital. The patient felt comfortable with discharge. The patient was discharged to home with vns for ostomy. The patient had no other issues.

## 2019-11-27 NOTE — DISCHARGE NOTE NURSING/CASE MANAGEMENT/SOCIAL WORK - PATIENT PORTAL LINK FT
You can access the FollowMyHealth Patient Portal offered by [facility] by registering at the following website: [email or web address]. By joining [email or web address]’s FollowMyHealth portal, you will also be able to view your health information using other applications (apps) compatible with our system.

## 2019-11-27 NOTE — PROGRESS NOTE ADULT - PROVIDER SPECIALTY LIST ADULT
Anesthesia
Anesthesia
Trauma Surgery
Anesthesia
Trauma Surgery
Anesthesia
Hospitalist
Hospitalist

## 2019-11-27 NOTE — PROGRESS NOTE ADULT - ASSESSMENT
69F s/p Isaias after bowel perforation from screening colonoscopy. Patient currently recovering appropriately on the floor.    - Advance to Regular diet this morning  - Pain control  - DVT ppx: Lovenox    ACS & Trauma surgery,  [id number]
69F s/p Isaias after bowel perforation from screening colonoscopy. Patient currently recovering appropriately on the floor.  - remove fabiana tomorrow  - CLD  - Abx: Zosyn  - Pain control  - DVT ppx: Lovenox    Acute Care Surgery  [id number] with any questions
69F POD0 for emergent Isaias with DLI. No significant pmh presenting with abdominal pain after screening colonoscopy with polypectomy and biopsies noted to have bowel perforation on imaging. Patient currently recovering appropriately on the floor.    - NPO/IVF  - Abx: Zosyn  - Pain control  - DVT ppx: Lovenox    Acute Care Surgery  [id number] with any questions
69F POD1 for emergent Isaias with DLI. No significant pmh presenting with abdominal pain after screening colonoscopy with polypectomy and biopsies noted to have bowel perforation on imaging. Patient currently recovering appropriately on the floor.    - Advanced to CLD  - D/C HERNANDO interiano, NGT removed this morning  - Jayda to come out on POD3  - Abx: Zosyn  - Pain control  - DVT ppx: Lovenox    Acute Care Surgery  [id number] with any questions
69F s/p Isaias after bowel perforation from screening colonoscopy. Patient currently recovering appropriately on the floor.  - F/u stat EKG to assess for QT changes in setting of Zofran us e  - Discuss if patient will receive NG tube for nausea   - CLD  - Abx: Zosyn, discontinue 11/27  - Pain control  - DVT ppx: Lovenox
doing well  continue current therapy
ASSESSMENT/PLAN: Continue current therapy
69f hx anxiety presenting with colonic perforation reportedly after recent screening colonoscopy with polypectomy, now s/p sergo's procedure with ongoing nausea and anxiety
69f hx anxiety presenting with colonic perforation reportedly after recent screening colonoscopy with polypectomy, now s/p sergo's procedure with ongoing nausea and anxiety

## 2019-11-27 NOTE — DISCHARGE NOTE NURSING/CASE MANAGEMENT/SOCIAL WORK - NSDCPNINST_GEN_ALL_CORE
If unable to tolerate diet, nausea, vomiting, fever above 100.3, chills, or increase in pain, notify provider or return to ER.

## 2019-11-27 NOTE — PROGRESS NOTE ADULT - PROBLEM SELECTOR PLAN 3
2/2 peritonitis s/p hartmanss, and now with clinical evidence of illeus despite lack of radiologic findings.   - continue zofran + reglan as per surgery, would check baseline EKG to eval QTC  - if persists, would place NG tube for decompression
2/2 peritonitis s/p hartmanss, and now with clinical evidence of illeus despite lack of radiologic findings.   - continue zofran + reglan as per surgery, baseline   - if persists, would place NG tube for decompression

## 2019-11-27 NOTE — DISCHARGE NOTE PROVIDER - NSDCCPCAREPLAN_GEN_ALL_CORE_FT
PRINCIPAL DISCHARGE DIAGNOSIS  Diagnosis: Perforated viscus  Assessment and Plan of Treatment: WOUND CARE: Staples will be removed at follow up office visit.  Cover incision with gauze and tape.    OSTOMY: Teaching provided by ostomy nurse.  Please monitor output, it should be between 400 ml and 1100 ml. Contact your surgeon if any changes within range.  BATHING: Please do not submerge wound underwater. You may shower and/or sponge bathe.  ACTIVITY: No heavy lifting anything more than 10-15lbs or straining. Otherwise, you may return to your usual level of physical activity. If you are taking narcotic pain medication (such as Percocet), do NOT drive a car, operate machinery or make important decisions.  DIET: Low fiber diet  NOTIFY YOUR SURGEON IF: You have any bleeding that does not stop, any pus draining from your wound, any fever (over 100.4 F) or chills, persistent nausea/vomiting with inability to tolerate food or liquids, persistent diarrhea, or if your pain is not controlled on your discharge pain medications.  FOLLOW-UP:  1. Please call to make a follow-up appointment within one week of discharge.   2. Please follow up with your primary care physician in one week regarding your hospitalization. PRINCIPAL DISCHARGE DIAGNOSIS  Diagnosis: Perforated viscus  Assessment and Plan of Treatment: WOUND CARE: Staples will be removed at follow up office visit.  Cover incision with gauze and tape.    OSTOMY: Teaching provided by ostomy nurse.  Please monitor output, it should be between 400 ml and 1100 ml. Contact your surgeon if any changes within range.  BATHING: Please do not submerge wound underwater. You may shower and/or sponge bathe.  ACTIVITY: No heavy lifting anything more than 10-15lbs or straining. Otherwise, you may return to your usual level of physical activity. If you are taking narcotic pain medication (such as Percocet), do NOT drive a car, operate machinery or make important decisions.  DIET: Low fiber diet  NOTIFY YOUR SURGEON IF: You have any bleeding that does not stop, any pus draining from your wound, any fever (over 100.4 F) or chills, persistent nausea/vomiting with inability to tolerate food or liquids, persistent diarrhea, or if your pain is not controlled on your discharge pain medications.  FOLLOW-UP:  1. Please call to make a follow-up appointment within one week of discharge.   2. Please follow up with your primary care physician in one week regarding your hospitalization.        SECONDARY DISCHARGE DIAGNOSES  Diagnosis: Anxiety  Assessment and Plan of Treatment: Continue lexapro 10 mg daily.    Diagnosis: Hypophosphatemia  Assessment and Plan of Treatment: repleted during hospital course

## 2019-11-27 NOTE — PROGRESS NOTE ADULT - PROBLEM SELECTOR PLAN 1
Pt s/p sergo's procedure, minimal ostomy output overnight. Ongoing nausea. No abd pain  - continue pain control and management as per surgery  - nausea management as below  - continue monitoring ostomy output
Pt s/p sergo's procedure, minimal ostomy output overnight. Ongoing nausea. No abd pain  - continue pain control and management as per surgery  - nausea management as below  - continue monitoring ostomy output

## 2019-11-27 NOTE — DISCHARGE NOTE PROVIDER - NSDCCPTREATMENT_GEN_ALL_CORE_FT
PRINCIPAL PROCEDURE  Procedure: Isaias colectomy  Findings and Treatment: · Operative Findings	Patient s/p colonoscopy with polypectomy earlier today found to have perforated viscus based on history/exam/scan. Taken for exlap, found to have turbid ascites and hyperemic small bowel (entire small bowel affected). Perforation found on anterior aspect of distal sigmoid colon. We excised affected segment of colon using blue-load staplers and matured an end colostomy, which was viable. Peritoneal cavity washed out with liters of warm NS.

## 2019-11-27 NOTE — PROGRESS NOTE ADULT - PROBLEM SELECTOR PLAN 2
Pt reports taking xanax prn as OP. No istop record found initially as patient's  and name are incorrect in EMR.  - Under name: Grecia Morales  46  - istop reference: Reference #: [id number]  - pt was last prescribed xanax 0.5mg po TID x 10 day supply in Aug 2019.  - may continue ativan 0.5mg iv bid while admitted for anxiety attacks due to acute stress and for potential benefit in mitigating nausea.   - would not prescribe on discharge  - continue lexapro 10mg po qhs
Pt reports taking xanax prn as OP. No istop record found initially as patient's  and name are incorrect in EMR.  - Under name: Grecia Morales  46  - istop reference: Reference #: [id number]  - pt was last prescribed xanax 0.5mg po TID x 10 day supply in Aug 2019.  - may continue ativan 0.5mg iv bid while admitted for anxiety attacks due to acute stress and for potential benefit in mitigating nausea.   - would not prescribe on discharge  - continue lexapro 10mg po daily

## 2019-11-27 NOTE — DISCHARGE NOTE PROVIDER - NSDCCPGOAL_GEN_ALL_CORE_FT
To get better and follow your care plan as instructed.
To get better and follow your care plan as instructed.
Male

## 2019-11-27 NOTE — PROGRESS NOTE ADULT - SUBJECTIVE AND OBJECTIVE BOX
Hydromorphone PCA 0.2q6 no continuous  adequate analgesia  minimal side effects
SUBJECTIVE: Patient doing well with IV PCA    Therapy: hydromorphone PCA 0.2q6    OBJECTIVE: Pain appropriately controlled, no side effects
Surgery Progress Note     Subjective/24hour Events:   Patient seen and examined.   Remains nauseous   Pain controlled.     Vital Signs:  Vital Signs Last 24 Hrs  T(C): 36.8 (26 Nov 2019 09:27), Max: 36.8 (25 Nov 2019 14:43)  T(F): 98.2 (26 Nov 2019 09:27), Max: 98.2 (25 Nov 2019 14:43)  HR: 78 (26 Nov 2019 09:27) (76 - 99)  BP: 155/80 (26 Nov 2019 09:27) (151/74 - 166/81)  BP(mean): --  RR: 18 (26 Nov 2019 09:27) (18 - 18)  SpO2: 95% (26 Nov 2019 09:27) (94% - 98%)    CAPILLARY BLOOD GLUCOSE          I&O's Detail    25 Nov 2019 07:01  -  26 Nov 2019 07:00  --------------------------------------------------------  IN:    dextrose 5% + sodium chloride 0.45% with potassium chloride 20 mEq/L: 1775 mL    IV PiggyBack: 200 mL  Total IN: 1975 mL    OUT:    Voided: 2100 mL  Total OUT: 2100 mL    Total NET: -125 mL      26 Nov 2019 07:01  -  26 Nov 2019 11:16  --------------------------------------------------------  IN:  Total IN: 0 mL    OUT:    Voided: 800 mL  Total OUT: 800 mL    Total NET: -800 mL          MEDICATIONS  (STANDING):  dextrose 5% + sodium chloride 0.45% with potassium chloride 20 mEq/L 1000 milliLiter(s) (75 mL/Hr) IV Continuous <Continuous>  enoxaparin Injectable 40 milliGRAM(s) SubCutaneous daily  escitalopram 10 milliGRAM(s) Oral at bedtime  pantoprazole  Injectable 40 milliGRAM(s) IV Push daily  piperacillin/tazobactam IVPB.. 3.375 Gram(s) IV Intermittent every 8 hours  potassium phosphate IVPB 15 milliMole(s) IV Intermittent once    MEDICATIONS  (PRN):  acetaminophen  IVPB .. 1000 milliGRAM(s) IV Intermittent once PRN Mild Pain (1 - 3)  LORazepam   Injectable 0.5 milliGRAM(s) IV Push two times a day PRN Anxiety  morphine  - Injectable 2 milliGRAM(s) IV Push every 4 hours PRN Severe Pain (7 - 10)  ondansetron Injectable 8 milliGRAM(s) IV Push every 8 hours PRN Nausea and/or Vomiting      Physical Exam:  Gen: NAD.  Lungs: Non labored breathing.   Ab: Soft, nontender, nondistended. Dressing CDI, fabiana removed. Dressing changed.   Ext: Moves all 4 spontaneously.     Labs:    11-26    140  |  104  |  10  ----------------------------<  101<H>  4.0   |  22  |  0.76    Ca    8.2<L>      26 Nov 2019 07:13  Phos  2.1     11-26  Mg     2.0     11-26                              11.7   8.19  )-----------( 183      ( 26 Nov 2019 07:19 )             36.2         Imaging:
TRAUMA SURGERY DAILY PROGRESS NOTE:    Overnight:  Patient admitted and underwent Isaias colectomy for perforation of colon as colonoscopy complication    Subjective:  Patient reports pain is well controlled. Denies N/V. Ostomy bag with no contents, stoma appropriately edematous in the post-operative period      Vital Signs Last 24 Hrs  T(C): 36.8 (23 Nov 2019 11:20), Max: 37.2 (23 Nov 2019 10:20)  T(F): 98.2 (23 Nov 2019 11:20), Max: 98.9 (23 Nov 2019 10:20)  HR: 87 (23 Nov 2019 11:20) (87 - 108)  BP: 149/76 (23 Nov 2019 11:20) (128/79 - 206/88)  BP(mean): 118 (23 Nov 2019 08:30) (108 - 128)  RR: 16 (23 Nov 2019 11:20) (14 - 18)  SpO2: 98% (23 Nov 2019 11:20) (94% - 100%)    Exam:  Gen: NAD, resting in bed  Resp: Airway patent, non-labored respirations  Abd: Soft, ND, NT, no rebound or guarding. Ostomy bag in place and with no contents   Ext: No edema, WWP  Neuro: AAOx3, no focal deficits    LABS:                        13.8   6.83  )-----------( 130      ( 23 Nov 2019 06:27 )             41.8     11-23    137  |  99  |  10  ----------------------------<  192<H>  3.3<L>   |  19<L>  |  0.67    Ca    7.6<L>      23 Nov 2019 06:25  Phos  2.6     11-23  Mg     1.8     11-23    TPro  7.6  /  Alb  4.5  /  TBili  0.9  /  DBili  x   /  AST  29  /  ALT  25  /  AlkPhos  101  11-22
TRAUMA SURGERY DAILY PROGRESS NOTE:    Overnight:  Underwent Isaias colectomy     Subjective:  Patient reports pain is well controlled. Denies N/V.  Ostomy bag empty      Vital Signs Last 24 Hrs  T(C): 36.6 (24 Nov 2019 10:49), Max: 36.9 (23 Nov 2019 12:25)  T(F): 97.9 (24 Nov 2019 10:49), Max: 98.5 (23 Nov 2019 12:25)  HR: 93 (24 Nov 2019 10:49) (77 - 101)  BP: 133/71 (24 Nov 2019 10:49) (125/79 - 148/77)  BP(mean): --  RR: 18 (24 Nov 2019 10:49) (16 - 18)  SpO2: 95% (24 Nov 2019 10:49) (95% - 97%)    Exam:  Gen: NAD, resting in bed  Resp: Airway patent, non-labored respirations  Abd: Soft, ND, NT, no rebound or guarding. ostomy pink and viable, ostomy bag in place, with mild bowel sweat, fabiana in the incision. incision without erythema and intact.  Ext: No edema, WWP  Neuro: AAOx3, no focal deficits    LABS:                        13.0   8.24  )-----------( 89       ( 24 Nov 2019 07:30 )             40.5     11-23    137  |  99  |  10  ----------------------------<  192<H>  3.3<L>   |  19<L>  |  0.67    Ca    7.6<L>      23 Nov 2019 06:25  Phos  2.6     11-23  Mg     1.8     11-23    TPro  7.6  /  Alb  4.5  /  TBili  0.9  /  DBili  x   /  AST  29  /  ALT  25  /  AlkPhos  101  11-22
Day 2 of Anesthesia Pain Management Service    SUBJECTIVE: I'm having nausea    Pain Scale Score:	[X] Refer to charted pain scores    THERAPY:    [ ] IV PCA Morphine		[ ] 5 mg/mL	[ ] 1 mg/mL  [X] IV PCA Hydromorphone	[ ] 5 mg/mL	[X] 1 mg/mL  [ ] IV PCA Fentanyl		[ ] 50 micrograms/mL    Demand dose: 0.2 mg     Lockout: 6 minutes   Continuous Rate: 0 mg/hr  4 Hour Limit: 4 mg    MEDICATIONS  (STANDING):  acetaminophen  IVPB .. 1000 milliGRAM(s) IV Intermittent every 6 hours  dextrose 5% + sodium chloride 0.45% with potassium chloride 20 mEq/L 1000 milliLiter(s) (75 mL/Hr) IV Continuous <Continuous>  enoxaparin Injectable 40 milliGRAM(s) SubCutaneous daily  escitalopram 10 milliGRAM(s) Oral at bedtime  HYDROmorphone PCA (1 mG/mL) 30 milliLiter(s) PCA Continuous PCA Continuous  piperacillin/tazobactam IVPB.. 3.375 Gram(s) IV Intermittent every 8 hours    MEDICATIONS  (PRN):  HYDROmorphone PCA (1 mG/mL) Rescue Clinician Bolus 0.5 milliGRAM(s) IV Push every 15 minutes PRN for Pain Scale GREATER THAN 6  naloxone Injectable 0.1 milliGRAM(s) IV Push every 3 minutes PRN For ANY of the following changes in patient status:  A. RR LESS THAN 10 breaths per minute, B. Oxygen saturation LESS THAN 90%, C. Sedation score of 6  ondansetron Injectable 4 milliGRAM(s) IV Push every 6 hours PRN Nausea      OBJECTIVE:    Sedation Score:	[ X] Alert 	[ ] Drowsy 	[ ] Arousable	[ ] Asleep	[ ] Unresponsive    Side Effects:	[  ] None	[X ] Nausea	[ ] Vomiting	[ ] Pruritus  		[ ] Other:    Vital Signs Last 24 Hrs  T(C): 36.4 (25 Nov 2019 06:01), Max: 36.9 (25 Nov 2019 01:29)  T(F): 97.5 (25 Nov 2019 06:01), Max: 98.5 (25 Nov 2019 01:29)  HR: 96 (25 Nov 2019 06:01) (77 - 101)  BP: 168/88 (25 Nov 2019 06:01) (124/74 - 168/88)  BP(mean): --  RR: 18 (25 Nov 2019 06:01) (17 - 18)  SpO2: 95% (25 Nov 2019 06:01) (95% - 96%)    ASSESSMENT/ PLAN    Therapy to  be:               [X] Continued   [ ] Discontinued   [ ] Changed to PRN Analgesics    Documentation and Verification of current medications:   [X] Done	[ ] Not done, not eligible    Comments: Endorsing nausea. PCA use minimal, 0-1x/hr. Demand dose decreased to 0.1mg. Continue IV acetaminiophen
TRAUMA SURGERY DAILY PROGRESS NOTE:    Overnight:  Advanced to CLD    Subjective:  Patient reports pain is well controlled. nausea mitigating. Tolerating clear liquid diet. ostomy bag in place and with stool and air in bag      Vital Signs Last 24 Hrs  T(C): 36.8 (27 Nov 2019 09:11), Max: 36.8 (26 Nov 2019 17:29)  T(F): 98.2 (27 Nov 2019 09:11), Max: 98.2 (26 Nov 2019 17:29)  HR: 84 (27 Nov 2019 09:11) (72 - 92)  BP: 146/75 (27 Nov 2019 09:11) (146/75 - 166/75)  BP(mean): --  RR: 18 (27 Nov 2019 09:11) (18 - 18)  SpO2: 96% (27 Nov 2019 09:11) (96% - 97%)    Exam:  Gen: NAD, resting in bed  Resp: Airway patent, non-labored respirations  Abd: Soft, ND, NT, no rebound or guarding. stoma pink and viable and ostomy bag with gas and stool   Ext: No edema, WWP  Neuro: AAOx3, no focal deficits    LABS:                        11.4   6.11  )-----------( 198      ( 27 Nov 2019 06:33 )             35.3     11-27    139  |  105  |  8   ----------------------------<  127<H>  3.8   |  23  |  0.78    Ca    8.5      27 Nov 2019 06:33  Phos  2.7     11-27  Mg     1.9     11-27
Pain Management Attending Addendum    SUBJECTIVE:    Therapy:	  [X ] IV PCA	   [ ] Epidural           [ ] s/p Spinal Opoid              [ ] Postpartum infusion	  [ ] Patient controlled regional anesthesia (PCRA)    [ ] prn Analgesics    OBJECTIVE:   [X ] No new signs     [ ] Other:    Side Effects:  [ ] None			[X ] Other: nausea    Assessment of Catheter Site:		[ ] Intact		[ ] Other:n/a    ASSESSMENT/PLAN  [X ] Continue current therapy    [ ] Therapy changed to:    [ ] IV PCA       [ ] Epidural     [ ] prn Analgesics     [ ] post partum infusion    Comments: will decrease PCEA dose
Surgery Progress Note     Subjective/24hour Events:   Patient seen and examined.   No acute events overnight.   Pain controlled.     Vital Signs:  Vital Signs Last 24 Hrs  T(C): 36.2 (25 Nov 2019 10:34), Max: 36.9 (25 Nov 2019 01:29)  T(F): 97.2 (25 Nov 2019 10:34), Max: 98.5 (25 Nov 2019 01:29)  HR: 84 (25 Nov 2019 10:34) (77 - 96)  BP: 158/79 (25 Nov 2019 10:34) (124/74 - 168/88)  BP(mean): --  RR: 17 (25 Nov 2019 10:34) (17 - 18)  SpO2: 98% (25 Nov 2019 10:34) (95% - 98%)    CAPILLARY BLOOD GLUCOSE          I&O's Detail    24 Nov 2019 07:01  -  25 Nov 2019 07:00  --------------------------------------------------------  IN:    dextrose 5% + sodium chloride 0.45% with potassium chloride 20 mEq/L: 1800 mL    IV PiggyBack: 300 mL    Oral Fluid: 820 mL  Total IN: 2920 mL    OUT:    Colostomy: 10 mL    Voided: 2000 mL  Total OUT: 2010 mL    Total NET: 910 mL      25 Nov 2019 07:01  -  25 Nov 2019 12:57  --------------------------------------------------------  IN:  Total IN: 0 mL    OUT:    Voided: 400 mL  Total OUT: 400 mL    Total NET: -400 mL          MEDICATIONS  (STANDING):  dextrose 5% + sodium chloride 0.45% with potassium chloride 20 mEq/L 1000 milliLiter(s) (75 mL/Hr) IV Continuous <Continuous>  enoxaparin Injectable 40 milliGRAM(s) SubCutaneous daily  escitalopram 10 milliGRAM(s) Oral at bedtime  HYDROmorphone PCA (1 mG/mL) 30 milliLiter(s) PCA Continuous PCA Continuous  piperacillin/tazobactam IVPB.. 3.375 Gram(s) IV Intermittent every 8 hours    MEDICATIONS  (PRN):  HYDROmorphone PCA (1 mG/mL) Rescue Clinician Bolus 0.5 milliGRAM(s) IV Push every 15 minutes PRN for Pain Scale GREATER THAN 6  naloxone Injectable 0.1 milliGRAM(s) IV Push every 3 minutes PRN For ANY of the following changes in patient status:  A. RR LESS THAN 10 breaths per minute, B. Oxygen saturation LESS THAN 90%, C. Sedation score of 6  ondansetron Injectable 8 milliGRAM(s) IV Push every 8 hours PRN Nausea and/or Vomiting      Physical Exam:  Gen: NAD, resting in bed  Resp: Airway patent, non-labored respirations  Abd: Soft, ND, NT, no rebound or guarding. ostomy pink and viable, ostomy bag in place, with mild bowel sweat, fabiana in the incision. incision without erythema and intact.  Ext: No edema, WWP  Neuro: AAOx3, no focal deficits      Labs:    11-25    139  |  106  |  11  ----------------------------<  98  4.0   |  23  |  0.75    Ca    7.8<L>      25 Nov 2019 06:53  Phos  2.5     11-25  Mg     2.1     11-25                              11.5   9.11  )-----------( 134      ( 25 Nov 2019 06:53 )             35.0         Imaging:
Dieudonne Montiel MD  Division of Hospital Medicine  Pager: [phone]  If no response or off-hours, page [phone]  -------------------------------------    Patient is a 69y old  Female who presents with a chief complaint of abd pain (27 Nov 2019 12:26)      SUBJECTIVE / OVERNIGHT EVENTS: no acute events  ADDITIONAL REVIEW OF SYSTEMS: nausea decreased, no vomiting. tolerated soup. No fevers/chills.     MEDICATIONS  (STANDING):  acetaminophen   Tablet .. 650 milliGRAM(s) Oral every 6 hours  enoxaparin Injectable 40 milliGRAM(s) SubCutaneous daily  escitalopram 10 milliGRAM(s) Oral at bedtime  melatonin 3 milliGRAM(s) Oral once  pantoprazole  Injectable 40 milliGRAM(s) IV Push daily    MEDICATIONS  (PRN):  ondansetron Injectable 8 milliGRAM(s) IV Push every 8 hours PRN Nausea and/or Vomiting  oxyCODONE    IR 5 milliGRAM(s) Oral every 4 hours PRN Moderate Pain (4 - 6)      CAPILLARY BLOOD GLUCOSE        I&O's Summary    26 Nov 2019 07:01  -  27 Nov 2019 07:00  --------------------------------------------------------  IN: 1400 mL / OUT: 2960 mL / NET: -1560 mL    27 Nov 2019 07:01  -  27 Nov 2019 15:07  --------------------------------------------------------  IN: 550 mL / OUT: 300 mL / NET: 250 mL        PHYSICAL EXAM:  Vital Signs Last 24 Hrs  T(C): 36.8 (27 Nov 2019 14:09), Max: 36.8 (26 Nov 2019 17:29)  T(F): 98.3 (27 Nov 2019 14:09), Max: 98.3 (27 Nov 2019 14:09)  HR: 82 (27 Nov 2019 14:09) (72 - 92)  BP: 156/83 (27 Nov 2019 14:09) (146/75 - 166/75)  BP(mean): --  RR: 18 (27 Nov 2019 14:09) (18 - 18)  SpO2: 98% (27 Nov 2019 14:09) (96% - 98%)  CONSTITUTIONAL: NAD, well-developed, well-groomed  EYES: PERRLA; conjunctiva and sclera clear  ENMT: Moist oral mucosa, no pharyngeal injection or exudates; normal dentition  NECK: Supple, no palpable masses; no thyromegaly  RESPIRATORY: Normal respiratory effort; lungs are clear to auscultation bilaterally  CARDIOVASCULAR: Regular rate and rhythm, normal S1 and S2, no murmur/rub/gallop; No lower extremity edema; Peripheral pulses are 2+ bilaterally  ABDOMEN: Nontender to palpation, normoactive bowel sounds, no rebound/guarding; No hepatosplenomegaly, +ostomy with increased stool output, +midline surgical wound dressed  MUSCLOSKELETAL:  Normal gait; no clubbing or cyanosis of digits; no joint swelling or tenderness to palpation  PSYCH: A+O to person, place, and time; affect appropriate  NEUROLOGY: CN 2-12 are intact and symmetric; no gross sensory deficits;   SKIN: No rashes; no palpable lesions    LABS:                        11.4   6.11  )-----------( 198      ( 27 Nov 2019 06:33 )             35.3     11-27    139  |  105  |  8   ----------------------------<  127<H>  3.8   |  23  |  0.78    Ca    8.5      27 Nov 2019 06:33  Phos  2.7     11-27  Mg     1.9     11-27                  RADIOLOGY & ADDITIONAL TESTS:  Results Reviewed:   Imaging Personally Reviewed:  Electrocardiogram Personally Reviewed:    COORDINATION OF CARE:  Care Discussed with Consultants/Other Providers [Y/N]:  Prior or Outpatient Records Reviewed [Y/N]:
Dieudonne Montiel MD  Division of Hospital Medicine  Pager: [phone]  If no response or off-hours, page [phone]  -------------------------------------    Patient is a 69y old  Female who presents with a chief complaint of colonic perforation (25 Nov 2019 17:31)      SUBJECTIVE / OVERNIGHT EVENTS: No acute events overnight. This morning, patient continues to be nauseated without vomiting, but ongoing belching. No gas. No abd pain. ROS otherwise negative.     MEDICATIONS  (STANDING):  dextrose 5% + sodium chloride 0.45% with potassium chloride 20 mEq/L 1000 milliLiter(s) (75 mL/Hr) IV Continuous <Continuous>  enoxaparin Injectable 40 milliGRAM(s) SubCutaneous daily  escitalopram 10 milliGRAM(s) Oral at bedtime  pantoprazole  Injectable 40 milliGRAM(s) IV Push daily  piperacillin/tazobactam IVPB.. 3.375 Gram(s) IV Intermittent every 8 hours    MEDICATIONS  (PRN):  acetaminophen  IVPB .. 1000 milliGRAM(s) IV Intermittent once PRN Mild Pain (1 - 3)  LORazepam   Injectable 0.5 milliGRAM(s) IV Push two times a day PRN Anxiety  morphine  - Injectable 2 milliGRAM(s) IV Push every 4 hours PRN Severe Pain (7 - 10)  ondansetron Injectable 8 milliGRAM(s) IV Push every 8 hours PRN Nausea and/or Vomiting      CAPILLARY BLOOD GLUCOSE        I&O's Summary    25 Nov 2019 07:01  -  26 Nov 2019 07:00  --------------------------------------------------------  IN: 1975 mL / OUT: 2100 mL / NET: -125 mL    26 Nov 2019 07:01  -  26 Nov 2019 13:27  --------------------------------------------------------  IN: 0 mL / OUT: 800 mL / NET: -800 mL        PHYSICAL EXAM:  Vital Signs Last 24 Hrs  T(C): 36.8 (26 Nov 2019 09:27), Max: 36.8 (25 Nov 2019 14:43)  T(F): 98.2 (26 Nov 2019 09:27), Max: 98.2 (25 Nov 2019 14:43)  HR: 78 (26 Nov 2019 09:27) (76 - 99)  BP: 155/80 (26 Nov 2019 09:27) (151/74 - 166/81)  BP(mean): --  RR: 18 (26 Nov 2019 09:27) (18 - 18)  SpO2: 95% (26 Nov 2019 09:27) (94% - 98%)  CONSTITUTIONAL: NAD, well-developed, well-groomed  EYES: PERRLA; conjunctiva and sclera clear  ENMT: Moist oral mucosa, no pharyngeal injection or exudates; normal dentition  NECK: Supple, no palpable masses; no thyromegaly  RESPIRATORY: Normal respiratory effort; lungs are clear to auscultation bilaterally  CARDIOVASCULAR: Regular rate and rhythm, normal S1 and S2, no murmur/rub/gallop; No lower extremity edema; Peripheral pulses are 2+ bilaterally  ABDOMEN: Nontender to palpation, normoactive bowel sounds, no rebound/guarding; No hepatosplenomegaly, +ostomy with soft brown stool, unchanged from yesterday. +midling abdominal surgical wound dressed.   MUSCLOSKELETAL:  Normal gait; no clubbing or cyanosis of digits; no joint swelling or tenderness to palpation  PSYCH: A+O to person, place, and time; affect appropriate  NEUROLOGY: CN 2-12 are intact and symmetric; no gross sensory deficits;   SKIN: No rashes; no palpable lesions    LABS:                        11.7   8.19  )-----------( 183      ( 26 Nov 2019 07:19 )             36.2     11-26    140  |  104  |  10  ----------------------------<  101<H>  4.0   |  22  |  0.76    Ca    8.2<L>      26 Nov 2019 07:13  Phos  2.1     11-26  Mg     2.0     11-26                  RADIOLOGY & ADDITIONAL TESTS:  Results Reviewed:   Imaging Personally Reviewed:  Electrocardiogram Personally Reviewed:    COORDINATION OF CARE:  Care Discussed with Consultants/Other Providers [Y/N]:  Prior or Outpatient Records Reviewed [Y/N]:

## 2019-11-27 NOTE — DISCHARGE NOTE PROVIDER - NSDCMRMEDTOKEN_GEN_ALL_CORE_FT
Pepcid: acetaminophen 325 mg oral tablet: 2 tab(s) orally every 6 hours  escitalopram 10 mg oral tablet: 1 tab(s) orally once a day (at bedtime)  oxyCODONE 5 mg oral tablet: 1 tab(s) orally every 6 hours, As Needed -Moderate Pain (4 - 6) MDD:4  Pepcid:

## 2019-11-27 NOTE — DISCHARGE NOTE PROVIDER - CARE PROVIDER_API CALL
Bean Zepeda)  Surgery; Surgical Critical Care  [address] [place] [address]  Phone: [phone]  Fax: [phone]  Follow Up Time:

## 2019-11-27 NOTE — PROGRESS NOTE ADULT - PROBLEM SELECTOR PLAN 5
Transitions of Care Status:  1.  Name of PCP: Dr. Vargas [phone]  2.  PCP Contacted on Admission: unk  3.  PCP contacted at Discharge: [ ] Y    [ ] N    [ ] N/A  4.  Post-Discharge Appointment Date and Location:  5.  Summary of Handoff given to PCP:
Transitions of Care Status:  1.  Name of PCP: Dr. Vargas [phone]  2.  PCP Contacted on Admission: unk  3.  PCP contacted at Discharge: yes  4.  Post-Discharge Appointment Date and Location: PCP to call patient  5.  Summary of Handoff given to PCP: Discussed hospital course, and plans for discharge today to home.

## 2019-11-27 NOTE — PROGRESS NOTE ADULT - PROBLEM SELECTOR PLAN 4
medications verified with [facility]: [phone]  - lexapro 10mg po qhs
medications verified with [facility]: [phone]  - lexapro 10mg po qhs

## 2019-11-29 ENCOUNTER — EMERGENCY (EMERGENCY)
Facility: HOSPITAL | Age: 69
LOS: 1 days | Discharge: HOME | End: 2019-11-29
Attending: EMERGENCY MEDICINE
Payer: MEDICARE

## 2019-11-29 VITALS
TEMPERATURE: 97 F | HEIGHT: 68 IN | RESPIRATION RATE: 16 BRPM | OXYGEN SATURATION: 98 % | WEIGHT: 149.91 LBS | HEART RATE: 84 BPM | SYSTOLIC BLOOD PRESSURE: 132 MMHG | DIASTOLIC BLOOD PRESSURE: 79 MMHG

## 2019-11-29 VITALS
DIASTOLIC BLOOD PRESSURE: 82 MMHG | OXYGEN SATURATION: 98 % | HEART RATE: 86 BPM | RESPIRATION RATE: 18 BRPM | SYSTOLIC BLOOD PRESSURE: 158 MMHG | TEMPERATURE: 98 F

## 2019-11-29 LAB
ALBUMIN SERPL ELPH-MCNC: 3.2 G/DL — LOW (ref 3.3–5)
ALP SERPL-CCNC: 143 U/L — HIGH (ref 40–120)
ALT FLD-CCNC: 27 U/L — SIGNIFICANT CHANGE UP (ref 10–45)
ANION GAP SERPL CALC-SCNC: 12 MMOL/L — SIGNIFICANT CHANGE UP (ref 5–17)
AST SERPL-CCNC: 16 U/L — SIGNIFICANT CHANGE UP (ref 10–40)
BASOPHILS # BLD AUTO: 0.01 K/UL — SIGNIFICANT CHANGE UP (ref 0–0.2)
BASOPHILS NFR BLD AUTO: 0.2 % — SIGNIFICANT CHANGE UP (ref 0–2)
BILIRUB SERPL-MCNC: 0.5 MG/DL — SIGNIFICANT CHANGE UP (ref 0.2–1.2)
BUN SERPL-MCNC: 9 MG/DL — SIGNIFICANT CHANGE UP (ref 7–23)
CALCIUM SERPL-MCNC: 8.5 MG/DL — SIGNIFICANT CHANGE UP (ref 8.4–10.5)
CHLORIDE SERPL-SCNC: 102 MMOL/L — SIGNIFICANT CHANGE UP (ref 96–108)
CO2 SERPL-SCNC: 24 MMOL/L — SIGNIFICANT CHANGE UP (ref 22–31)
CREAT SERPL-MCNC: 0.75 MG/DL — SIGNIFICANT CHANGE UP (ref 0.5–1.3)
EOSINOPHIL # BLD AUTO: 0.11 K/UL — SIGNIFICANT CHANGE UP (ref 0–0.5)
EOSINOPHIL NFR BLD AUTO: 2.2 % — SIGNIFICANT CHANGE UP (ref 0–6)
GLUCOSE SERPL-MCNC: 119 MG/DL — HIGH (ref 70–99)
HCT VFR BLD CALC: 32.4 % — LOW (ref 34.5–45)
HGB BLD-MCNC: 10.8 G/DL — LOW (ref 11.5–15.5)
IMM GRANULOCYTES NFR BLD AUTO: 1.6 % — HIGH (ref 0–1.5)
LYMPHOCYTES # BLD AUTO: 1.34 K/UL — SIGNIFICANT CHANGE UP (ref 1–3.3)
LYMPHOCYTES # BLD AUTO: 27.3 % — SIGNIFICANT CHANGE UP (ref 13–44)
MCHC RBC-ENTMCNC: 30.9 PG — SIGNIFICANT CHANGE UP (ref 27–34)
MCHC RBC-ENTMCNC: 33.3 GM/DL — SIGNIFICANT CHANGE UP (ref 32–36)
MCV RBC AUTO: 92.6 FL — SIGNIFICANT CHANGE UP (ref 80–100)
MONOCYTES # BLD AUTO: 0.55 K/UL — SIGNIFICANT CHANGE UP (ref 0–0.9)
MONOCYTES NFR BLD AUTO: 11.2 % — SIGNIFICANT CHANGE UP (ref 2–14)
NEUTROPHILS # BLD AUTO: 2.81 K/UL — SIGNIFICANT CHANGE UP (ref 1.8–7.4)
NEUTROPHILS NFR BLD AUTO: 57.5 % — SIGNIFICANT CHANGE UP (ref 43–77)
NRBC # BLD: 0 /100 WBCS — SIGNIFICANT CHANGE UP (ref 0–0)
PLATELET # BLD AUTO: 170 K/UL — SIGNIFICANT CHANGE UP (ref 150–400)
POTASSIUM SERPL-MCNC: 3.5 MMOL/L — SIGNIFICANT CHANGE UP (ref 3.5–5.3)
POTASSIUM SERPL-SCNC: 3.5 MMOL/L — SIGNIFICANT CHANGE UP (ref 3.5–5.3)
PROT SERPL-MCNC: 6 G/DL — SIGNIFICANT CHANGE UP (ref 6–8.3)
RBC # BLD: 3.5 M/UL — LOW (ref 3.8–5.2)
RBC # FLD: 14.3 % — SIGNIFICANT CHANGE UP (ref 10.3–14.5)
SODIUM SERPL-SCNC: 138 MMOL/L — SIGNIFICANT CHANGE UP (ref 135–145)
WBC # BLD: 4.9 K/UL — SIGNIFICANT CHANGE UP (ref 3.8–10.5)
WBC # FLD AUTO: 4.9 K/UL — SIGNIFICANT CHANGE UP (ref 3.8–10.5)

## 2019-11-29 PROCEDURE — 99284 EMERGENCY DEPT VISIT MOD MDM: CPT

## 2019-11-29 PROCEDURE — 99024 POSTOP FOLLOW-UP VISIT: CPT

## 2019-11-29 RX ADMIN — Medication 1 TABLET(S): at 23:54

## 2019-11-29 NOTE — ED PROVIDER NOTE - OBJECTIVE STATEMENT
69F with pmh s/p Isaias colectomy for perforation of colon as colonoscopy complication on 11/23/19 presenting after staples fell off from midline incision. Reports some leanne-incisional tenderness and erythema with some serosanguineous drainage. Patient states that since discharge two days ago she has otherwise been feeling well without fevers, nausea, vomiting. Colostomy has been productive with flatus and stool.

## 2019-11-29 NOTE — CONSULT NOTE ADULT - ATTENDING COMMENTS
Agree with above. Cellulitis c/w superficial SSI, recommend abx. Incision open, no deeper infection, no purulence.

## 2019-11-29 NOTE — ED PROVIDER NOTE - PHYSICAL EXAMINATION
NAD, VSS, Afebrile, Lungs clear. ABD soft, colostomy in place functioning with flatus and stool, midline incision with interrupted staples and leanne-incisional erythema, and associated tenderness with serosanguineous drainage, No CVA tender. Neuro- intact.

## 2019-11-29 NOTE — ED PROVIDER NOTE - NSFOLLOWUPINSTRUCTIONS_ED_ALL_ED_FT
Keep wound clean and dry.  Keep continue your current medications, including pain medication as directed.  Augmentin 1 tablet every 12hours for 7days.  Follow up with your Surgeon Dr. Bass, call Monday for an appointment in 2-3days.  Return for any concerns or worsening symptoms.

## 2019-11-29 NOTE — CONSULT NOTE ADULT - ASSESSMENT
69F with pmh s/p Isaias colectomy for perforation of colon as colonoscopy complication on 11/23/19 presenting after staples fell off from midline incision noted to have superficial skin infection    - Will follow up labs and determine need for imaging    REBECA Flores, PGY2  Acute Care Surgery 69F with pmh s/p Isaias colectomy for perforation of colon as colonoscopy complication on 11/23/19 presenting after staples fell off from midline incision noted to have superficial skin infection    - No leukocytosis or fevers and therefore unlikely there is presence of deeper infection, no imaging needed at this time  - Recommend course of PO Augmentin   - Erythema outlined with marker, patient instructed to call surgery office if erythema begins to extend beyond marked margins  - Disposition per ED    REBECA Flores, PGY2  Acute Care Surgery  [phone] with any questions

## 2019-11-29 NOTE — ED PROVIDER NOTE - ATTENDING CONTRIBUTION TO CARE
69y F sp Hartmans procedure on Nov 23 after colon perf during colonoscopy here for wound check. Pt thinks a staple or two may have popped out and is now noting some serosanguinous drainage from inferior aspect of suture line abutting pouch. There is some mild erythema and skin thickening to the suture line 69y F sp Pacheco's procedure on Nov 23 after colon perf during colonoscopy here for wound check. Pt thinks a staple or two may have popped out and is now noting some serosanguinous drainage from inferior aspect of suture line abutting pouch. There is some mild erythema and skin thickening to the suture line as well concerning for possible cellulitis. Mild dehiscence. Pt endorses mild abd pain which has not changed signif since surgery and is relieved by tylenol. Pt also notes good output of both stool and gas in ostomy bag. No fevers. No NV. Will consult surgery. Check basic labs.

## 2019-11-29 NOTE — ED PROVIDER NOTE - PATIENT PORTAL LINK FT
You can access the FollowMyHealth Patient Portal offered by [facility] by registering at the following website: [email or web address]. By joining [employer]’s FollowMyHealth portal, you will also be able to view your health information using other applications (apps) compatible with our system.

## 2019-11-29 NOTE — CONSULT NOTE ADULT - SUBJECTIVE AND OBJECTIVE BOX
[facility] General Surgery Consultation     Patient is a 69y old  Female who presents with a chief complaint of wound staple opening.    HPI:    69F with pmh s/p Isaias colectomy for perforation of colon as colonoscopy complication on 11/23/19 presenting after staples fell off from midline incision. Reports some leanne-incisional tenderness and erythema with some serosanguineous drainage. Patient states that since discharge two days ago she has otherwise been feeling well without fevers, nausea, vomiting. Colostomy has been productive with flatus and stool.      PAST MEDICAL & SURGICAL HISTORY:  No pertinent past medical history  No significant past surgical history      FAMILY HISTORY:      SOCIAL HISTORY:  - Denies smoking or ETOH use    MEDICATIONS  (STANDING):    MEDICATIONS  (PRN):    Allergies    No Known Allergies    Intolerances        Vital Signs Last 24 Hrs  T(C): 36.1 (29 Nov 2019 19:54), Max: 36.1 (29 Nov 2019 19:54)  T(F): 97 (29 Nov 2019 19:54), Max: 97 (29 Nov 2019 19:54)  HR: 84 (29 Nov 2019 19:54) (84 - 84)  BP: 132/79 (29 Nov 2019 19:54) (132/79 - 132/79)  BP(mean): --  RR: 16 (29 Nov 2019 19:54) (16 - 16)  SpO2: 98% (29 Nov 2019 19:54) (98% - 98%)  Daily Height in cm: 172.72 (29 Nov 2019 19:54)    Daily     General: NAD, well-nourished  HEENT: Atraumatic, EOMI  Resp: Breathing comfortably on RA  CV: Normal sinus rhythm  Abd: soft, ND, colostomy in place functioning with flatus and stool, midline incision with interrupted staples and leanne-incisional erythema and associated tenderness with serosanguineous drainage, no purulent drainage noted  Ext: ROMIx4, motor strength intact x 4                  Radiographic Findings:       Assessment: [facility] General Surgery Consultation     Patient is a 69y old  Female who presents with a chief complaint of wound staple opening.    HPI:    69F with pmh s/p Isaias colectomy for perforation of colon as colonoscopy complication on 11/23/19 presenting after staples fell off from midline incision. Reports some leanne-incisional tenderness and erythema with some serosanguineous drainage. Patient states that since discharge two days ago she has otherwise been feeling well without fevers, nausea, vomiting. Colostomy has been productive with flatus and stool.      PAST MEDICAL & SURGICAL HISTORY:  No pertinent past medical history  No significant past surgical history      FAMILY HISTORY:      SOCIAL HISTORY:  - Denies smoking or ETOH use    MEDICATIONS  (STANDING):    MEDICATIONS  (PRN):    Allergies    No Known Allergies    Intolerances        Vital Signs Last 24 Hrs  T(C): 36.1 (29 Nov 2019 19:54), Max: 36.1 (29 Nov 2019 19:54)  T(F): 97 (29 Nov 2019 19:54), Max: 97 (29 Nov 2019 19:54)  HR: 84 (29 Nov 2019 19:54) (84 - 84)  BP: 132/79 (29 Nov 2019 19:54) (132/79 - 132/79)  BP(mean): --  RR: 16 (29 Nov 2019 19:54) (16 - 16)  SpO2: 98% (29 Nov 2019 19:54) (98% - 98%)  Daily Height in cm: 172.72 (29 Nov 2019 19:54)    Daily     General: NAD, well-nourished  HEENT: Atraumatic, EOMI  Resp: Breathing comfortably on RA  CV: Normal sinus rhythm  Abd: soft, ND, colostomy in place functioning with flatus and stool, midline incision with interrupted staples and leanne-incisional erythema and associated tenderness with serosanguineous drainage, no purulent drainage noted  Ext: ROMIx4, motor strength intact x 4

## 2019-12-04 PROBLEM — Z00.00 ENCOUNTER FOR PREVENTIVE HEALTH EXAMINATION: Status: ACTIVE | Noted: 2019-12-04

## 2019-12-09 ENCOUNTER — APPOINTMENT (OUTPATIENT)
Dept: TRAUMA SURGERY | Facility: CLINIC | Age: 69
End: 2019-12-09
Payer: MEDICARE

## 2019-12-09 VITALS
WEIGHT: 150 LBS | SYSTOLIC BLOOD PRESSURE: 119 MMHG | DIASTOLIC BLOOD PRESSURE: 75 MMHG | BODY MASS INDEX: 22.73 KG/M2 | HEIGHT: 68 IN | TEMPERATURE: 98.3 F | HEART RATE: 78 BPM

## 2019-12-09 LAB
SURGICAL PATHOLOGY STUDY: SIGNIFICANT CHANGE UP

## 2019-12-09 PROCEDURE — 99024 POSTOP FOLLOW-UP VISIT: CPT

## 2020-01-06 ENCOUNTER — APPOINTMENT (OUTPATIENT)
Dept: SURGERY | Facility: CLINIC | Age: 70
End: 2020-01-06
Payer: MEDICARE

## 2020-01-06 VITALS
HEART RATE: 67 BPM | WEIGHT: 147 LBS | DIASTOLIC BLOOD PRESSURE: 75 MMHG | TEMPERATURE: 97.6 F | SYSTOLIC BLOOD PRESSURE: 128 MMHG | BODY MASS INDEX: 22.28 KG/M2 | HEIGHT: 68 IN

## 2020-01-06 DIAGNOSIS — Z86.59 PERSONAL HISTORY OF OTHER MENTAL AND BEHAVIORAL DISORDERS: ICD-10-CM

## 2020-01-06 DIAGNOSIS — Z93.3 COLOSTOMY STATUS: ICD-10-CM

## 2020-01-06 DIAGNOSIS — Z87.19 PERSONAL HISTORY OF OTHER DISEASES OF THE DIGESTIVE SYSTEM: ICD-10-CM

## 2020-01-06 PROCEDURE — 99203 OFFICE O/P NEW LOW 30 MIN: CPT

## 2020-01-06 RX ORDER — OMEPRAZOLE 40 MG/1
CAPSULE, DELAYED RELEASE ORAL
Refills: 0 | Status: ACTIVE | COMMUNITY

## 2020-01-06 RX ORDER — ACETAMINOPHEN 325 MG/1
TABLET, FILM COATED ORAL
Refills: 0 | Status: ACTIVE | COMMUNITY

## 2020-02-11 ENCOUNTER — OUTPATIENT (OUTPATIENT)
Dept: OUTPATIENT SERVICES | Facility: HOSPITAL | Age: 70
LOS: 1 days | End: 2020-02-11
Payer: MEDICARE

## 2020-02-11 VITALS
HEART RATE: 87 BPM | TEMPERATURE: 98 F | DIASTOLIC BLOOD PRESSURE: 60 MMHG | OXYGEN SATURATION: 98 % | RESPIRATION RATE: 14 BRPM | SYSTOLIC BLOOD PRESSURE: 112 MMHG

## 2020-02-11 DIAGNOSIS — Z93.3 COLOSTOMY STATUS: ICD-10-CM

## 2020-02-11 DIAGNOSIS — Z90.49 ACQUIRED ABSENCE OF OTHER SPECIFIED PARTS OF DIGESTIVE TRACT: Chronic | ICD-10-CM

## 2020-02-11 LAB
ANION GAP SERPL CALC-SCNC: 10 MMO/L — SIGNIFICANT CHANGE UP (ref 7–14)
BLD GP AB SCN SERPL QL: NEGATIVE — SIGNIFICANT CHANGE UP
BUN SERPL-MCNC: 14 MG/DL — SIGNIFICANT CHANGE UP (ref 7–23)
CALCIUM SERPL-MCNC: 8.7 MG/DL — SIGNIFICANT CHANGE UP (ref 8.4–10.5)
CHLORIDE SERPL-SCNC: 104 MMOL/L — SIGNIFICANT CHANGE UP (ref 98–107)
CO2 SERPL-SCNC: 26 MMOL/L — SIGNIFICANT CHANGE UP (ref 22–31)
CREAT SERPL-MCNC: 1.02 MG/DL — SIGNIFICANT CHANGE UP (ref 0.5–1.3)
GLUCOSE SERPL-MCNC: 76 MG/DL — SIGNIFICANT CHANGE UP (ref 70–99)
HBA1C BLD-MCNC: 4.7 % — SIGNIFICANT CHANGE UP (ref 4–5.6)
HCT VFR BLD CALC: 34.8 % — SIGNIFICANT CHANGE UP (ref 34.5–45)
HGB BLD-MCNC: 10.8 G/DL — LOW (ref 11.5–15.5)
MCHC RBC-ENTMCNC: 29.3 PG — SIGNIFICANT CHANGE UP (ref 27–34)
MCHC RBC-ENTMCNC: 31 % — LOW (ref 32–36)
MCV RBC AUTO: 94.6 FL — SIGNIFICANT CHANGE UP (ref 80–100)
NRBC # FLD: 0 K/UL — SIGNIFICANT CHANGE UP (ref 0–0)
PLATELET # BLD AUTO: 158 K/UL — SIGNIFICANT CHANGE UP (ref 150–400)
PMV BLD: 12.6 FL — SIGNIFICANT CHANGE UP (ref 7–13)
POTASSIUM SERPL-MCNC: 4.3 MMOL/L — SIGNIFICANT CHANGE UP (ref 3.5–5.3)
POTASSIUM SERPL-SCNC: 4.3 MMOL/L — SIGNIFICANT CHANGE UP (ref 3.5–5.3)
RBC # BLD: 3.68 M/UL — LOW (ref 3.8–5.2)
RBC # FLD: 13.1 % — SIGNIFICANT CHANGE UP (ref 10.3–14.5)
RH IG SCN BLD-IMP: POSITIVE — SIGNIFICANT CHANGE UP
SODIUM SERPL-SCNC: 140 MMOL/L — SIGNIFICANT CHANGE UP (ref 135–145)
WBC # BLD: 4.22 K/UL — SIGNIFICANT CHANGE UP (ref 3.8–10.5)
WBC # FLD AUTO: 4.22 K/UL — SIGNIFICANT CHANGE UP (ref 3.8–10.5)

## 2020-02-11 PROCEDURE — 93010 ELECTROCARDIOGRAM REPORT: CPT

## 2020-02-11 RX ORDER — SODIUM CHLORIDE 9 MG/ML
1000 INJECTION, SOLUTION INTRAVENOUS
Refills: 0 | Status: DISCONTINUED | OUTPATIENT
Start: 2020-02-18 | End: 2020-02-19

## 2020-02-11 NOTE — H&P PST ADULT - NSANTHOSAYNRD_GEN_A_CORE
mild snoring/No. JERAMIE screening performed.  STOP BANG Legend: 0-2 = LOW Risk; 3-4 = INTERMEDIATE Risk; 5-8 = HIGH Risk

## 2020-02-11 NOTE — H&P PST ADULT - ABILITY TO HEAR (WITH HEARING AID OR HEARING APPLIANCE IF NORMALLY USED):
mild [place] - no hearing aid/Mildly to Moderately Impaired: difficulty hearing in some environments or speaker may need to increase volume or speak distinctly

## 2020-02-11 NOTE — H&P PST ADULT - HISTORY OF PRESENT ILLNESS
This is a 69 y.o. female s/p colon resection with colostomy s/p colonoscopy 11/2019 ( [facility] ) . Pt offers no complaints in pst now for surgery .

## 2020-02-11 NOTE — H&P PST ADULT - GASTROINTESTINAL COMMENTS
colostomy - functioning well LLQ with colostomy bag in place , functioning well for small amount stool

## 2020-02-11 NOTE — H&P PST ADULT - RESPIRATORY
Pulmonary/Critical Care History and Physical Examination/Consultation    Chief Complaint: Bilateral Pneumonia, Acute Hypoxic Respiratory failure, Hypotension, Hx Essential hypertension, Hyperglycemia, CKD III    Patient Active Problem List   Diagnosis   • Thoracic spondylosis without myelopathy   • Gait instability   • Fall   • Essential hypertension, benign   • Closed fracture of hip, left, initial encounter (CMS/[facility])       History of Present Illness:       Patient is a 87 year old female who presented to the emergency room this evening with complaints of dyspnea which began on the day of admission.  The patient had decreased po intake and was \"more tired\". The patient denied cough or sputum production, has no history of heart or lung disease.  CXR in the emergency room revealed bilateral pulmonary infiltrates.  In the emergency room, the patient required high levels of supplemental O2, then BiPAP for non-invasive vent support.  The patient continued to deteriorate and required intubation and mechanical ventilatory support.    After arrival to the ICU, the patient developed hypotension and required vasopressor treatment to support her blood pressure.    Discussed with daughter regarding patient's condition and plans for care.  Answered daughter's questions.    Past Medical History:   has a past medical history of Closed fracture of hip, left, initial encounter (CMS/[facility]) (8/7/2018), Essential (primary) hypertension, and Thoracic spondylosis without myelopathy (9/26/2016). She also has no past medical history of Alzheimer's dementia (CMS/[facility]), Anemia, Anxiety, Attention deficit disorder, Blood clot associated with vein wall inflammation, Bronchitis, Cerebral infarction (CMS/[facility]), Chronic kidney disease, Chronic pain, Congestive cardiac failure (CMS/[facility]), COPD (chronic obstructive pulmonary disease) (CMS/[facility]), Coronary artery disease, Depression, Diabetes mellitus (CMS/[facility]), Gastroesophageal reflux disease, High  cholesterol, Inflammatory bowel disease, Liver disease, Malignant neoplasm (CMS/HCC), Myocardial infarction (CMS/HCC), Osteoporosis, Otitis media, Pneumonia, RAD (reactive airway disease), Sinusitis, chronic, Sleep apnea, Staphylococcus aureus infection, Thyroid condition, Urinary incontinence, or Urinary tract infection.    Past Surgical History   has a past surgical history that includes Appendectomy; cataract extraction w/ intraocular lens  implant, bilateral (Bilateral, 2010); and open fixatn prox end/neck femur fx (8/7/2018).    Allergies:  ALLERGIES:   Allergen Reactions   • Vigamox [Moxifloxacin] PRURITUS       Family History:  Family History   Problem Relation Age of Onset   • Stroke Mother        Social History:   reports that she has never smoked. She has never used smokeless tobacco. She reports current alcohol use. She reports that she does not use drugs.    Review of Systems:  As outlined in history of present illness.  Patient intubated and on mechanical vent support on arrival to the ICU; additional review of systems unable to be obtained.  From review of the patient's records, review of systems obtained when patient presented to the emergency room were as follows:       Review of Systems   Constitutional: Negative for activity change, appetite change, chills, fatigue and fever.   HENT: Negative for rhinorrhea and sore throat.    Eyes: Negative for photophobia and pain.   Respiratory: Positive for shortness of breath. Negative for cough and chest tightness.    Cardiovascular: Negative for chest pain and palpitations.   Gastrointestinal: Negative for abdominal pain, constipation, diarrhea, nausea and vomiting.   Genitourinary: Negative for dysuria, flank pain and urgency.   Musculoskeletal: Negative for arthralgias and back pain.   Skin: Negative for pallor and rash.   Neurological: Positive for weakness. Negative for syncope, light-headedness and headaches.   Psychiatric/Behavioral: Negative for  behavioral problems and suicidal ideas.   All other systems reviewed and are negative.        Current Facility-Administered Medications   Medication Dose Route Frequency Provider Last Rate Last Dose   • NORepinephrine (LEVOPHED) 8 mg/250 mL in sodium chloride 0.9 % infusion  0-30 mcg/min Intravenous Continuous Sylvain Gutierrez MD   Stopped at 11/11/19 0232   • sodium chloride 0.9 % flush bag 25 mL  25 mL Intravenous PRN César Mathews DO       • sodium chloride (PF) 0.9 % injection 2 mL  2 mL Intracatheter 2 times per day César Mathews DO       • propofol (DIPRIVAN) infusion  0-50 mcg/kg/min (Order-Specific) Intravenous Continuous César Mathews DO 9.2 mL/hr at 11/11/19 0005 20 mcg/kg/min at 11/11/19 0005   • sodium chloride (NORMAL SALINE) 0.9 % bolus 1,000 mL  1,000 mL Intravenous Once César Mathews DO       • SODIUM CHLORIDE 0.9 % IV SOLN Pyxis Override                Examination:    Visit Vitals  /79   Pulse 85   Temp 97.5 °F (36.4 °C) (Temporal)   Resp (!) 33   Ht 5' 2\" (1.575 m)   Wt 77.3 kg   SpO2 96%   BMI 31.17 kg/m²       Vitals:    11/10/19 2049 11/10/19 2050 11/10/19 2055 11/10/19 2057   BP:  146/79  146/79   Pulse: 88 86 126 85   Resp: (!) 33      Temp:       TempSrc:       SpO2: 90% 94% 96% 96%   Weight:       Height:           Physical Examination:    General: Elderly female sedated, intubated, on mechanical vent support  HEENT: PERRL  Neck: Supple  Lymph: No axillary or cervical adenopathy  Lungs: Rhonchi and crackles heard bilaterally  Heart: RRR  Abdomen: Soft, non-tender, with bowel sounds heard in all quadrants; no masses or organomegally  Extremity: Warm, no edema  Pulses: Equal pulses palpated bilaterally upper and lower extremities  Neurologic: Patient sedated on mechanical vent support  Skin: Warm and dry  Capillary Refill Upper extremities bilaterally: normal  Psych: Sedated    LABS:    Results for SONIDO AQUINO (MRN [id number]) as of 11/11/2019 03:14   Ref. Range 11/10/2019 19:38  11/10/2019 19:43 11/10/2019 19:45 11/10/2019 19:48 11/10/2019 19:58 11/10/2019 20:10 11/10/2019 20:45 11/10/2019 20:58 11/10/2019 21:04 11/10/2019 22:04 11/10/2019 22:34 11/10/2019 22:47 11/10/2019 23:25 11/10/2019 23:47 11/11/2019 01:17   pH Arterial Latest Ref Range: 7.35 - 7.45 Units               7.42   PCO2 Arterial Latest Ref Range: 32 - 45 mm Hg               44   PO2 Arterial Latest Ref Range: 83 - 108 mm Hg               173 (H)   HCO3 Arterial Latest Ref Range: 22 - 28 mmol/L               29 (H)   Base Excess Arterial Latest Ref Range: 0 - 3 mmol/L               4 (H)   PH Venous Latest Ref Range: 7.35 - 7.45 Units   7.27 (L)          7.25 (L)     PCO2 Venous Latest Ref Range: 38 - 51 mm Hg   77 (H)               HCO3 Venous Latest Ref Range: 22 - 28 mmol/L   36 (H)               Base Excess Venous Latest Ref Range: 0 - 2 mmol/L   5 (H)               MV FIO2 Latest Units: %               100   Carbon Monoxide Latest Ref Range: <1.5 %             1.4     Methemoglobin POC Latest Ref Range: <1.6 %             0.5     O2 SAT Arterial Latest Ref Range: >95 %               100   O2 SAT Venous Latest Ref Range: 60 - 80 %   53 (L)               PO2 Venous Latest Ref Range: 35 - 42 mm Hg   34 (L)               Sodium POC Latest Ref Range: 135 - 145 mmol/L 142            145     Potassium POC Latest Ref Range: 3.4 - 5.1 mmol/L 4.0            4.5     POCT Chloride Latest Ref Range: 98 - 107 mmol/L 96 (L)            99     CO2 Total Latest Ref Range: 19 - 24 mmol/L 34 (H)              30 (H)   POCT Anion Gap Latest Units: mmol/L 17                 Troponin I POC Latest Ref Range: <0.10 ng/mL  <0.10                POCT Hematocrit Latest Ref Range: 36.0 - 46.5 % 58.0 (H)            50.4 (H)  52.4 (H)   POCT Hemoglobin Latest Ref Range: 12.0 - 15.5 g/dL 19.7 (H)            16.5 (H)  17.1 (H)   Glucometer Glucose Latest Ref Range: 70 - 99 mg/dL            131 (H)      BLOOD CULTURE Unknown        Rpt Rpt         POCT  BUN Latest Ref Range: 6 - 20 mg/dL 51 (H)                 POCT Calcium Ionized Latest Ref Range: 1.15 - 1.29 mmol/L 1.18            1.03 (L)     POCT Creatinine Latest Ref Range: 0.51 - 0.95 mg/dL 1.20 (H)                 Estimated GFR  (POC) Unknown 46                 Estimated GFR Non- (POC) Unknown 40                 GLUCOSE POC Latest Ref Range: 70 - 99 mg/dL 227 (H)            157 (H)     Lactic Acid Venous Latest Ref Range: <2.0 mmol/L 3.7 (HH)         2.2 (HH)   2.2 (HH)         RADIOLOGY:    XR CHEST AP OR PA   CLINICAL INFORMATION:  Central line check.  COMPARISON: Chest radiograph 1 hour prior.  TECHNIQUE:  Frontal portable 75 degrees upright view of the chest.  FINDINGS:  Interval placement of a left IJ central venous catheter with the tip  projecting over the cavoatrial junction. Endotracheal tube projects 3.4 cm  from the elidia. Enteric tube courses below the diaphragm and outside the  field-of-view.  The cardiomediastinal contour is within normal limits. Pulmonary venouscongestion and fine interstitial markings and Kerley B-lines. Bibasilar  atelectasis. Small persistent left pleural effusion. No pneumothorax.  IMPRESSION:   1.  Interval placement of a left IJ central venous catheter with the tip at  the cavoatrial junction. No pneumothorax.  2.  Mild venous congestion and interstitial edema, unchanged from one hour  prior but improved from 3 hours prior.  3.  Small persistent left pleural effusion with adjacent atelectasis.      ASSESSMENT/PLAN:    1) Bilateral pulmonary infiltrates C/W pneumonia    2) Lactic Acidosis/Metabolic Acidosis - Will continue to monitor lactic acid levels until decreasing    3) Acute hypoxic respiratory failure - On mechanical vent support.    4) Hx Essential Hypertension    5) Hypotension/Shock/Sepsis - On vasopressor support    6) Hyperglycemia - Continue to monitor blood sugars, with treatment with insulin to scale.    7) CKD III -  Continue to monitor creatine          At present, the patient is critically ill, with high risk of morbidity and mortality.  Will continue to closely monitor and treat in critical care setting for now.  The patient was seen and evaluated throughout the night, and discussed with the critical care team.  Labs and radiographs were reviewed.      ACCS Attestation       Ms. Lee is critically ill as documented above.  I evaluated the patient and reviewed imaging and laboratory data.  Critical care services I provided [id number] > 60 minutes not including time allocated for procedures.    Admission Requirements and Anticipated Length of Stay:  The patient is expected to require a two-midnight stay in the hospital.  Admission is required to provide services and treatment only available in the hospital.  The decision to admit the patient is supported by the followin.  The documented complex medical factors and comorbidities.  2.  The severity of signs and symptoms.  3.  The current and anticipated ongoing medical needs.  4.  The potential risk for an adverse event or outcome.        Critical Care 60 minutes      Sylvain Gutierrez MD             detailed exam

## 2020-02-11 NOTE — H&P PST ADULT - NSICDXPROBLEM_GEN_ALL_CORE_FT
PROBLEM DIAGNOSES  Problem: Colostomy status  Assessment and Plan: reversal of colostomy second stage [place]

## 2020-02-11 NOTE — H&P PST ADULT - TEACHING/LEARNING RELIGIOUS CONSIDERATIONS
Bedside shift change report given to Yolanda Atkinson RN (oncoming nurse) by Flavio Fitzgerald RN (offgoing nurse). Report included the following information SBAR, MAR and Recent Results. none

## 2020-02-11 NOTE — H&P PST ADULT - RS GEN PE MLT RESP DETAILS PC
breath sounds equal/good air movement/respirations non-labored/clear to auscultation bilaterally/no rales/no rhonchi/no wheezes

## 2020-02-17 ENCOUNTER — TRANSCRIPTION ENCOUNTER (OUTPATIENT)
Age: 70
End: 2020-02-17

## 2020-02-17 NOTE — ASU PATIENT PROFILE, ADULT - ABILITY TO HEAR (WITH HEARING AID OR HEARING APPLIANCE IF NORMALLY USED):
mild [place] - no hearing aid/Mildly to Moderately Impaired: difficulty hearing in some environments or speaker may need to increase volume or speak distinctly

## 2020-02-18 ENCOUNTER — APPOINTMENT (OUTPATIENT)
Dept: SURGERY | Facility: HOSPITAL | Age: 70
End: 2020-02-18

## 2020-02-18 ENCOUNTER — INPATIENT (INPATIENT)
Facility: HOSPITAL | Age: 70
LOS: 4 days | Discharge: HOME HEALTH | End: 2020-02-23
Attending: SURGERY | Admitting: SURGERY
Payer: MEDICARE

## 2020-02-18 ENCOUNTER — RESULT REVIEW (OUTPATIENT)
Age: 70
End: 2020-02-18

## 2020-02-18 VITALS
RESPIRATION RATE: 16 BRPM | HEIGHT: 68 IN | SYSTOLIC BLOOD PRESSURE: 145 MMHG | WEIGHT: 149.91 LBS | DIASTOLIC BLOOD PRESSURE: 53 MMHG | TEMPERATURE: 98 F | HEART RATE: 67 BPM | OXYGEN SATURATION: 100 %

## 2020-02-18 DIAGNOSIS — Z93.3 COLOSTOMY STATUS: ICD-10-CM

## 2020-02-18 DIAGNOSIS — Z90.49 ACQUIRED ABSENCE OF OTHER SPECIFIED PARTS OF DIGESTIVE TRACT: Chronic | ICD-10-CM

## 2020-02-18 LAB
GLUCOSE BLDC GLUCOMTR-MCNC: 95 MG/DL — SIGNIFICANT CHANGE UP (ref 70–99)
RH IG SCN BLD-IMP: POSITIVE — SIGNIFICANT CHANGE UP

## 2020-02-18 PROCEDURE — 88304 TISSUE EXAM BY PATHOLOGIST: CPT | Mod: 26

## 2020-02-18 PROCEDURE — 44139 MOBILIZATION OF COLON: CPT

## 2020-02-18 PROCEDURE — 44145 PARTIAL REMOVAL OF COLON: CPT

## 2020-02-18 PROCEDURE — 49560: CPT | Mod: 59

## 2020-02-18 PROCEDURE — 88307 TISSUE EXAM BY PATHOLOGIST: CPT | Mod: 26

## 2020-02-18 PROCEDURE — 44145 PARTIAL REMOVAL OF COLON: CPT | Mod: AS

## 2020-02-18 PROCEDURE — 44139 MOBILIZATION OF COLON: CPT | Mod: AS

## 2020-02-18 PROCEDURE — 88305 TISSUE EXAM BY PATHOLOGIST: CPT | Mod: 26

## 2020-02-18 RX ORDER — ONDANSETRON 8 MG/1
4 TABLET, FILM COATED ORAL EVERY 6 HOURS
Refills: 0 | Status: DISCONTINUED | OUTPATIENT
Start: 2020-02-18 | End: 2020-02-21

## 2020-02-18 RX ORDER — ALPRAZOLAM 0.25 MG
0.25 TABLET ORAL DAILY
Refills: 0 | Status: DISCONTINUED | OUTPATIENT
Start: 2020-02-18 | End: 2020-02-18

## 2020-02-18 RX ORDER — HYDROMORPHONE HYDROCHLORIDE 2 MG/ML
30 INJECTION INTRAMUSCULAR; INTRAVENOUS; SUBCUTANEOUS
Refills: 0 | Status: DISCONTINUED | OUTPATIENT
Start: 2020-02-18 | End: 2020-02-21

## 2020-02-18 RX ORDER — CEFOTETAN DISODIUM 1 G
2 VIAL (EA) INJECTION EVERY 12 HOURS
Refills: 0 | Status: COMPLETED | OUTPATIENT
Start: 2020-02-18 | End: 2020-02-19

## 2020-02-18 RX ORDER — ACETAMINOPHEN 500 MG
1000 TABLET ORAL EVERY 6 HOURS
Refills: 0 | Status: DISCONTINUED | OUTPATIENT
Start: 2020-02-18 | End: 2020-02-18

## 2020-02-18 RX ORDER — NALOXONE HYDROCHLORIDE 4 MG/.1ML
0.1 SPRAY NASAL
Refills: 0 | Status: DISCONTINUED | OUTPATIENT
Start: 2020-02-18 | End: 2020-02-21

## 2020-02-18 RX ORDER — ACETAMINOPHEN 500 MG
1000 TABLET ORAL EVERY 6 HOURS
Refills: 0 | Status: COMPLETED | OUTPATIENT
Start: 2020-02-18 | End: 2020-02-18

## 2020-02-18 RX ORDER — ALVIMOPAN 12 MG/1
12 CAPSULE ORAL
Refills: 0 | Status: DISCONTINUED | OUTPATIENT
Start: 2020-02-18 | End: 2020-02-23

## 2020-02-18 RX ORDER — ESCITALOPRAM OXALATE 10 MG/1
10 TABLET, FILM COATED ORAL DAILY
Refills: 0 | Status: DISCONTINUED | OUTPATIENT
Start: 2020-02-18 | End: 2020-02-23

## 2020-02-18 RX ORDER — HEPARIN SODIUM 5000 [USP'U]/ML
5000 INJECTION INTRAVENOUS; SUBCUTANEOUS EVERY 8 HOURS
Refills: 0 | Status: DISCONTINUED | OUTPATIENT
Start: 2020-02-18 | End: 2020-02-21

## 2020-02-18 RX ORDER — ALVIMOPAN 12 MG/1
12 CAPSULE ORAL ONCE
Refills: 0 | Status: COMPLETED | OUTPATIENT
Start: 2020-02-18 | End: 2020-02-18

## 2020-02-18 RX ORDER — ACETAMINOPHEN 500 MG
1000 TABLET ORAL EVERY 6 HOURS
Refills: 0 | Status: COMPLETED | OUTPATIENT
Start: 2020-02-18 | End: 2020-02-19

## 2020-02-18 RX ADMIN — HYDROMORPHONE HYDROCHLORIDE 30 MILLILITER(S): 2 INJECTION INTRAMUSCULAR; INTRAVENOUS; SUBCUTANEOUS at 12:00

## 2020-02-18 RX ADMIN — Medication 1000 MILLIGRAM(S): at 19:31

## 2020-02-18 RX ADMIN — Medication 400 MILLIGRAM(S): at 12:23

## 2020-02-18 RX ADMIN — HYDROMORPHONE HYDROCHLORIDE 30 MILLILITER(S): 2 INJECTION INTRAMUSCULAR; INTRAVENOUS; SUBCUTANEOUS at 20:04

## 2020-02-18 RX ADMIN — Medication 1000 MILLIGRAM(S): at 12:45

## 2020-02-18 RX ADMIN — ALVIMOPAN 12 MILLIGRAM(S): 12 CAPSULE ORAL at 19:00

## 2020-02-18 RX ADMIN — HEPARIN SODIUM 5000 UNIT(S): 5000 INJECTION INTRAVENOUS; SUBCUTANEOUS at 22:58

## 2020-02-18 RX ADMIN — ALVIMOPAN 12 MILLIGRAM(S): 12 CAPSULE ORAL at 07:16

## 2020-02-18 RX ADMIN — HEPARIN SODIUM 5000 UNIT(S): 5000 INJECTION INTRAVENOUS; SUBCUTANEOUS at 13:44

## 2020-02-18 RX ADMIN — Medication 100 GRAM(S): at 19:01

## 2020-02-18 RX ADMIN — SODIUM CHLORIDE 125 MILLILITER(S): 9 INJECTION, SOLUTION INTRAVENOUS at 19:02

## 2020-02-18 RX ADMIN — SODIUM CHLORIDE 30 MILLILITER(S): 9 INJECTION, SOLUTION INTRAVENOUS at 07:16

## 2020-02-18 RX ADMIN — Medication 400 MILLIGRAM(S): at 19:01

## 2020-02-18 NOTE — CHART NOTE - NSCHARTNOTEFT_GEN_A_CORE
A TEAM POST-OPERATIVE NOTE    Patient is s/p colostomy reversal. Seen and examined in PACU, recovering appropriately. Patient reports mild pain, controlled with PCA pump. Has not had any sips of liquids. No flatus or bowel movement. Denies chest pain/SOB. Denies nausea/vomiting.    Vital Signs Last 24 Hrs  T(C): 36.6 (18 Feb 2020 13:00), Max: 36.8 (18 Feb 2020 06:12)  T(F): 97.9 (18 Feb 2020 13:00), Max: 98.2 (18 Feb 2020 06:12)  HR: 89 (18 Feb 2020 14:00) (67 - 90)  BP: 140/61 (18 Feb 2020 14:00) (119/55 - 145/53)  BP(mean): 80 (18 Feb 2020 14:00) (69 - 103)  RR: 16 (18 Feb 2020 14:00) (11 - 28)  SpO2: 98% (18 Feb 2020 14:00) (97% - 100%)  I&O's Detail    18 Feb 2020 07:01  -  18 Feb 2020 15:08  --------------------------------------------------------  IN:    lactated ringers.: 375 mL  Total IN: 375 mL    OUT:    Bulb: 110 mL    Indwelling Catheter - Urethral: 260 mL  Total OUT: 370 mL    Total NET: 5 mL    cefoTEtan  IVPB 2  cefoTEtan  IVPB 2  heparin  Injectable 5000    PAST MEDICAL & SURGICAL HISTORY:  Anxiety and depression  S/P colon resection: colostomy 11/20 - [facility]      Physical Exam:  General: Awake, alert & fully oriented, no acute distress   Pulmonary: Respirations unlabored, clear bilaterally  Cardiovascular: s1/s2, no murmur   Abdominal: Soft, appropriately tender, provena VAC in place holding suction, JPx1 RLQ w/ serosang output  Extremities: WWP    LABS:      CAPILLARY BLOOD GLUCOSE      POCT Blood Glucose.: 95 mg/dL (18 Feb 2020 06:25)      Radiology and Additional Studies:    Assessment:  The patient is a 69y Female who is now several hours post-op from a colostomy reversal    Plan:  - Pain control as needed with standing IV Ofirmev/PCA  - Continue home Lexapro  - NPO w/ sips  - DVT ppx  - OOB and ambulating as tolerated  - F/u AM labs    A Team Surgery   #[id number]

## 2020-02-18 NOTE — CHART NOTE - NSCHARTNOTEFT_GEN_A_CORE
CAPRINI SCORE [CLOT]    AGE RELATED RISK FACTORS                                                       MOBILITY RELATED FACTORS  [ ] Age 41-60 years                                            (1 Point)                  [ ] Bed rest                                                        (1 Point)  [x] Age: 61-74 years                                           (2 Points)                 [ ] Plaster cast                                                   (2 Points)  [ ] Age= 75 years                                              (3 Points)                 [ ] Bed bound for more than 72 hours                 (2 Points)    DISEASE RELATED RISK FACTORS                                               GENDER SPECIFIC FACTORS  [ ] Edema in the lower extremities                       (1 Point)                  [ ] Pregnancy                                                     (1 Point)  [ ] Varicose veins                                               (1 Point)                  [ ] Post-partum < 6 weeks                                   (1 Point)             [ ] BMI > 25 Kg/m2                                            (1 Point)                  [ ] Hormonal therapy  or oral contraception          (1 Point)                 [ ] Sepsis (in the previous month)                        (1 Point)                  [ ] History of pregnancy complications                 (1 point)  [ ] Pneumonia or serious lung disease                                               [ ] Unexplained or recurrent                     (1 Point)           (in the previous month)                               (1 Point)  [ ] Abnormal pulmonary function test                     (1 Point)                 SURGERY RELATED RISK FACTORS  [ ] Acute myocardial infarction                              (1 Point)                 [ ]  Section                                             (1 Point)  [ ] Congestive heart failure (in the previous month)  (1 Point)               [ ] Minor surgery                                                  (1 Point)   [ ] Inflammatory bowel disease                             (1 Point)                 [ ] Arthroscopic surgery                                        (2 Points)  [ ] Central venous access                                      (2 Points)                [x] General surgery lasting more than 45 minutes   (2 Points)       [ ] Stroke (in the previous month)                          (5 Points)               [ ] Elective arthroplasty                                         (5 Points)                                                                                                                                               HEMATOLOGY RELATED FACTORS                                                 TRAUMA RELATED RISK FACTORS  [ ] Prior episodes of VTE                                     (3 Points)                [ ] Fracture of the hip, pelvis, or leg                       (5 Points)  [ ] Positive family history for VTE                         (3 Points)                 [ ] Acute spinal cord injury (in the previous month)  (5 Points)  [ ] Prothrombin 81514 A                                     (3 Points)                 [ ] Paralysis  (less than 1 month)                             (5 Points)  [ ] Factor V Leiden                                             (3 Points)                  [ ] Multiple Trauma within 1 month                        (5 Points)  [ ] Lupus anticoagulants                                     (3 Points)                                                           [ ] Anticardiolipin antibodies                               (3 Points)                                                       [ ] High homocysteine in the blood                      (3 Points)                                             [ ] Other congenital or acquired thrombophilia      (3 Points)                                                [ ] Heparin induced thrombocytopenia                  (3 Points)                                          Total Score [  4  ]    Caprini Score 0 - 2:  Low Risk, No VTE Prophylaxis required for most patients, encourage ambulation  Caprini Score 3 - 6:  At Risk, pharmacologic VTE prophylaxis is indicated for most patients (in the absence of a contraindication)  Caprini Score Greater than or = 7:  High Risk, pharmacologic VTE prophylaxis is indicated for most patients (in the absence of a contraindication)

## 2020-02-18 NOTE — BRIEF OPERATIVE NOTE - NSICDXBRIEFPOSTOP_GEN_ALL_CORE_FT
POST-OP DIAGNOSIS:  Perforation of colon as colonoscopy complication 18-Feb-2020 11:21:16  Brad Shelby

## 2020-02-18 NOTE — BRIEF OPERATIVE NOTE - NSICDXBRIEFPREOP_GEN_ALL_CORE_FT
PRE-OP DIAGNOSIS:  Perforation of colon as colonoscopy complication 18-Feb-2020 11:21:03  Brad Shelby

## 2020-02-19 LAB
ANION GAP SERPL CALC-SCNC: 13 MMO/L — SIGNIFICANT CHANGE UP (ref 7–14)
BUN SERPL-MCNC: 13 MG/DL — SIGNIFICANT CHANGE UP (ref 7–23)
CALCIUM SERPL-MCNC: 7.9 MG/DL — LOW (ref 8.4–10.5)
CHLORIDE SERPL-SCNC: 103 MMOL/L — SIGNIFICANT CHANGE UP (ref 98–107)
CO2 SERPL-SCNC: 22 MMOL/L — SIGNIFICANT CHANGE UP (ref 22–31)
CREAT SERPL-MCNC: 0.75 MG/DL — SIGNIFICANT CHANGE UP (ref 0.5–1.3)
GLUCOSE SERPL-MCNC: 151 MG/DL — HIGH (ref 70–99)
HCT VFR BLD CALC: 29.8 % — LOW (ref 34.5–45)
HGB BLD-MCNC: 10 G/DL — LOW (ref 11.5–15.5)
MAGNESIUM SERPL-MCNC: 1.5 MG/DL — LOW (ref 1.6–2.6)
MCHC RBC-ENTMCNC: 30.7 PG — SIGNIFICANT CHANGE UP (ref 27–34)
MCHC RBC-ENTMCNC: 33.6 % — SIGNIFICANT CHANGE UP (ref 32–36)
MCV RBC AUTO: 91.4 FL — SIGNIFICANT CHANGE UP (ref 80–100)
NRBC # FLD: 0 K/UL — SIGNIFICANT CHANGE UP (ref 0–0)
PHOSPHATE SERPL-MCNC: 3.4 MG/DL — SIGNIFICANT CHANGE UP (ref 2.5–4.5)
PLATELET # BLD AUTO: 155 K/UL — SIGNIFICANT CHANGE UP (ref 150–400)
PMV BLD: 12 FL — SIGNIFICANT CHANGE UP (ref 7–13)
POTASSIUM SERPL-MCNC: 4.4 MMOL/L — SIGNIFICANT CHANGE UP (ref 3.5–5.3)
POTASSIUM SERPL-SCNC: 4.4 MMOL/L — SIGNIFICANT CHANGE UP (ref 3.5–5.3)
RBC # BLD: 3.26 M/UL — LOW (ref 3.8–5.2)
RBC # FLD: 13.1 % — SIGNIFICANT CHANGE UP (ref 10.3–14.5)
SODIUM SERPL-SCNC: 138 MMOL/L — SIGNIFICANT CHANGE UP (ref 135–145)
WBC # BLD: 12.02 K/UL — HIGH (ref 3.8–10.5)
WBC # FLD AUTO: 12.02 K/UL — HIGH (ref 3.8–10.5)

## 2020-02-19 RX ORDER — MAGNESIUM SULFATE 500 MG/ML
2 VIAL (ML) INJECTION ONCE
Refills: 0 | Status: COMPLETED | OUTPATIENT
Start: 2020-02-19 | End: 2020-02-19

## 2020-02-19 RX ORDER — ACETAMINOPHEN 500 MG
1000 TABLET ORAL EVERY 6 HOURS
Refills: 0 | Status: COMPLETED | OUTPATIENT
Start: 2020-02-19 | End: 2020-02-20

## 2020-02-19 RX ORDER — PANTOPRAZOLE SODIUM 20 MG/1
40 TABLET, DELAYED RELEASE ORAL
Refills: 0 | Status: DISCONTINUED | OUTPATIENT
Start: 2020-02-19 | End: 2020-02-23

## 2020-02-19 RX ORDER — DEXTROSE MONOHYDRATE, SODIUM CHLORIDE, AND POTASSIUM CHLORIDE 50; .745; 4.5 G/1000ML; G/1000ML; G/1000ML
1000 INJECTION, SOLUTION INTRAVENOUS
Refills: 0 | Status: DISCONTINUED | OUTPATIENT
Start: 2020-02-19 | End: 2020-02-20

## 2020-02-19 RX ORDER — ACETAMINOPHEN 500 MG
975 TABLET ORAL EVERY 6 HOURS
Refills: 0 | Status: DISCONTINUED | OUTPATIENT
Start: 2020-02-19 | End: 2020-02-19

## 2020-02-19 RX ADMIN — ALVIMOPAN 12 MILLIGRAM(S): 12 CAPSULE ORAL at 18:09

## 2020-02-19 RX ADMIN — Medication 1000 MILLIGRAM(S): at 18:46

## 2020-02-19 RX ADMIN — HYDROMORPHONE HYDROCHLORIDE 30 MILLILITER(S): 2 INJECTION INTRAMUSCULAR; INTRAVENOUS; SUBCUTANEOUS at 20:22

## 2020-02-19 RX ADMIN — Medication 1000 MILLIGRAM(S): at 23:58

## 2020-02-19 RX ADMIN — HEPARIN SODIUM 5000 UNIT(S): 5000 INJECTION INTRAVENOUS; SUBCUTANEOUS at 06:17

## 2020-02-19 RX ADMIN — Medication 400 MILLIGRAM(S): at 00:31

## 2020-02-19 RX ADMIN — Medication 1000 MILLIGRAM(S): at 01:01

## 2020-02-19 RX ADMIN — Medication 1000 MILLIGRAM(S): at 06:48

## 2020-02-19 RX ADMIN — HEPARIN SODIUM 5000 UNIT(S): 5000 INJECTION INTRAVENOUS; SUBCUTANEOUS at 23:55

## 2020-02-19 RX ADMIN — DEXTROSE MONOHYDRATE, SODIUM CHLORIDE, AND POTASSIUM CHLORIDE 50 MILLILITER(S): 50; .745; 4.5 INJECTION, SOLUTION INTRAVENOUS at 13:07

## 2020-02-19 RX ADMIN — Medication 100 GRAM(S): at 07:08

## 2020-02-19 RX ADMIN — PANTOPRAZOLE SODIUM 40 MILLIGRAM(S): 20 TABLET, DELAYED RELEASE ORAL at 07:08

## 2020-02-19 RX ADMIN — Medication 50 GRAM(S): at 11:06

## 2020-02-19 RX ADMIN — Medication 400 MILLIGRAM(S): at 13:05

## 2020-02-19 RX ADMIN — ALVIMOPAN 12 MILLIGRAM(S): 12 CAPSULE ORAL at 07:08

## 2020-02-19 RX ADMIN — HYDROMORPHONE HYDROCHLORIDE 30 MILLILITER(S): 2 INJECTION INTRAMUSCULAR; INTRAVENOUS; SUBCUTANEOUS at 08:22

## 2020-02-19 RX ADMIN — Medication 400 MILLIGRAM(S): at 18:16

## 2020-02-19 RX ADMIN — Medication 400 MILLIGRAM(S): at 23:28

## 2020-02-19 RX ADMIN — Medication 400 MILLIGRAM(S): at 06:17

## 2020-02-19 RX ADMIN — Medication 1000 MILLIGRAM(S): at 13:35

## 2020-02-19 RX ADMIN — ESCITALOPRAM OXALATE 10 MILLIGRAM(S): 10 TABLET, FILM COATED ORAL at 18:09

## 2020-02-19 RX ADMIN — HEPARIN SODIUM 5000 UNIT(S): 5000 INJECTION INTRAVENOUS; SUBCUTANEOUS at 14:09

## 2020-02-19 NOTE — PHYSICAL THERAPY INITIAL EVALUATION ADULT - ADDITIONAL COMMENTS
Patient lives in a private house with a "couple" steps to enter. One flight of stairs within home. Patient lives with son and reports ambulating independently prior to admission.    Patient was left supine in bed as found, all lines/tubes intact and call bell within reach, RN aware.

## 2020-02-19 NOTE — PHYSICAL THERAPY INITIAL EVALUATION ADULT - PERTINENT HX OF CURRENT PROBLEM, REHAB EVAL
Patient is a 69 year old female presenting s/p colon resection with colostomy s/p colonoscopy 11/2019 ( [facility] ). Patient now s/p reversal of colostomy 2/18/20. PMH: anxiety and depression

## 2020-02-19 NOTE — PHYSICAL THERAPY INITIAL EVALUATION ADULT - DISCHARGE DISPOSITION, PT EVAL
Anticipated discharge to home with home physical therapy services to improve functional mobility and strength, to optimize safety within the home environment.

## 2020-02-19 NOTE — PHYSICAL THERAPY INITIAL EVALUATION ADULT - PATIENT PROFILE REVIEW, REHAB EVAL
PT orders received: Ambulate as tolerated, OOB to chair. Consult with RN Kendall JARRETT, patient may participate in PT evaluation./yes

## 2020-02-19 NOTE — PHYSICAL THERAPY INITIAL EVALUATION ADULT - GENERAL OBSERVATIONS, REHAB EVAL
Patient received semisupine in bed, +MEMO drain, +prevena plus, +IV, +pulse oximeter, in no apparent distress. Patient agreeable to participate in physical therapy evaluation.

## 2020-02-20 LAB
ANION GAP SERPL CALC-SCNC: 10 MMO/L — SIGNIFICANT CHANGE UP (ref 7–14)
BASOPHILS # BLD AUTO: 0.01 K/UL — SIGNIFICANT CHANGE UP (ref 0–0.2)
BASOPHILS NFR BLD AUTO: 0.1 % — SIGNIFICANT CHANGE UP (ref 0–2)
BUN SERPL-MCNC: 13 MG/DL — SIGNIFICANT CHANGE UP (ref 7–23)
CALCIUM SERPL-MCNC: 7.9 MG/DL — LOW (ref 8.4–10.5)
CHLORIDE SERPL-SCNC: 102 MMOL/L — SIGNIFICANT CHANGE UP (ref 98–107)
CO2 SERPL-SCNC: 24 MMOL/L — SIGNIFICANT CHANGE UP (ref 22–31)
CREAT SERPL-MCNC: 0.7 MG/DL — SIGNIFICANT CHANGE UP (ref 0.5–1.3)
EOSINOPHIL # BLD AUTO: 0 K/UL — SIGNIFICANT CHANGE UP (ref 0–0.5)
EOSINOPHIL NFR BLD AUTO: 0 % — SIGNIFICANT CHANGE UP (ref 0–6)
GLUCOSE SERPL-MCNC: 113 MG/DL — HIGH (ref 70–99)
HCT VFR BLD CALC: 27 % — LOW (ref 34.5–45)
HGB BLD-MCNC: 8.6 G/DL — LOW (ref 11.5–15.5)
IMM GRANULOCYTES NFR BLD AUTO: 0.7 % — SIGNIFICANT CHANGE UP (ref 0–1.5)
LYMPHOCYTES # BLD AUTO: 0.83 K/UL — LOW (ref 1–3.3)
LYMPHOCYTES # BLD AUTO: 7.9 % — LOW (ref 13–44)
MAGNESIUM SERPL-MCNC: 2.2 MG/DL — SIGNIFICANT CHANGE UP (ref 1.6–2.6)
MCHC RBC-ENTMCNC: 29.4 PG — SIGNIFICANT CHANGE UP (ref 27–34)
MCHC RBC-ENTMCNC: 31.9 % — LOW (ref 32–36)
MCV RBC AUTO: 92.2 FL — SIGNIFICANT CHANGE UP (ref 80–100)
MONOCYTES # BLD AUTO: 0.38 K/UL — SIGNIFICANT CHANGE UP (ref 0–0.9)
MONOCYTES NFR BLD AUTO: 3.6 % — SIGNIFICANT CHANGE UP (ref 2–14)
NEUTROPHILS # BLD AUTO: 9.16 K/UL — HIGH (ref 1.8–7.4)
NEUTROPHILS NFR BLD AUTO: 87.7 % — HIGH (ref 43–77)
NRBC # FLD: 0 K/UL — SIGNIFICANT CHANGE UP (ref 0–0)
PHOSPHATE SERPL-MCNC: 1.5 MG/DL — LOW (ref 2.5–4.5)
PLATELET # BLD AUTO: 151 K/UL — SIGNIFICANT CHANGE UP (ref 150–400)
PMV BLD: 12.2 FL — SIGNIFICANT CHANGE UP (ref 7–13)
POTASSIUM SERPL-MCNC: 4.1 MMOL/L — SIGNIFICANT CHANGE UP (ref 3.5–5.3)
POTASSIUM SERPL-SCNC: 4.1 MMOL/L — SIGNIFICANT CHANGE UP (ref 3.5–5.3)
RBC # BLD: 2.93 M/UL — LOW (ref 3.8–5.2)
RBC # FLD: 13.5 % — SIGNIFICANT CHANGE UP (ref 10.3–14.5)
REVIEW TO FOLLOW: YES — SIGNIFICANT CHANGE UP
SODIUM SERPL-SCNC: 136 MMOL/L — SIGNIFICANT CHANGE UP (ref 135–145)
WBC # BLD: 10.45 K/UL — SIGNIFICANT CHANGE UP (ref 3.8–10.5)
WBC # FLD AUTO: 10.45 K/UL — SIGNIFICANT CHANGE UP (ref 3.8–10.5)

## 2020-02-20 RX ORDER — ACETAMINOPHEN 500 MG
1000 TABLET ORAL EVERY 6 HOURS
Refills: 0 | Status: DISCONTINUED | OUTPATIENT
Start: 2020-02-20 | End: 2020-02-21

## 2020-02-20 RX ORDER — KETOROLAC TROMETHAMINE 30 MG/ML
15 SYRINGE (ML) INJECTION EVERY 6 HOURS
Refills: 0 | Status: DISCONTINUED | OUTPATIENT
Start: 2020-02-20 | End: 2020-02-22

## 2020-02-20 RX ORDER — DEXTROSE MONOHYDRATE, SODIUM CHLORIDE, AND POTASSIUM CHLORIDE 50; .745; 4.5 G/1000ML; G/1000ML; G/1000ML
1000 INJECTION, SOLUTION INTRAVENOUS
Refills: 0 | Status: DISCONTINUED | OUTPATIENT
Start: 2020-02-20 | End: 2020-02-21

## 2020-02-20 RX ORDER — LANOLIN ALCOHOL/MO/W.PET/CERES
3 CREAM (GRAM) TOPICAL ONCE
Refills: 0 | Status: COMPLETED | OUTPATIENT
Start: 2020-02-20 | End: 2020-02-20

## 2020-02-20 RX ADMIN — Medication 400 MILLIGRAM(S): at 12:01

## 2020-02-20 RX ADMIN — Medication 85 MILLIMOLE(S): at 10:52

## 2020-02-20 RX ADMIN — Medication 1000 MILLIGRAM(S): at 05:33

## 2020-02-20 RX ADMIN — ALVIMOPAN 12 MILLIGRAM(S): 12 CAPSULE ORAL at 06:22

## 2020-02-20 RX ADMIN — ALVIMOPAN 12 MILLIGRAM(S): 12 CAPSULE ORAL at 19:13

## 2020-02-20 RX ADMIN — Medication 1000 MILLIGRAM(S): at 12:32

## 2020-02-20 RX ADMIN — Medication 400 MILLIGRAM(S): at 19:13

## 2020-02-20 RX ADMIN — Medication 1000 MILLIGRAM(S): at 19:43

## 2020-02-20 RX ADMIN — Medication 3 MILLIGRAM(S): at 02:26

## 2020-02-20 RX ADMIN — DEXTROSE MONOHYDRATE, SODIUM CHLORIDE, AND POTASSIUM CHLORIDE 50 MILLILITER(S): 50; .745; 4.5 INJECTION, SOLUTION INTRAVENOUS at 10:52

## 2020-02-20 RX ADMIN — PANTOPRAZOLE SODIUM 40 MILLIGRAM(S): 20 TABLET, DELAYED RELEASE ORAL at 06:21

## 2020-02-20 RX ADMIN — ESCITALOPRAM OXALATE 10 MILLIGRAM(S): 10 TABLET, FILM COATED ORAL at 12:01

## 2020-02-20 RX ADMIN — HYDROMORPHONE HYDROCHLORIDE 30 MILLILITER(S): 2 INJECTION INTRAMUSCULAR; INTRAVENOUS; SUBCUTANEOUS at 02:32

## 2020-02-20 RX ADMIN — Medication 400 MILLIGRAM(S): at 05:03

## 2020-02-20 RX ADMIN — HEPARIN SODIUM 5000 UNIT(S): 5000 INJECTION INTRAVENOUS; SUBCUTANEOUS at 13:04

## 2020-02-20 RX ADMIN — Medication 15 MILLIGRAM(S): at 19:43

## 2020-02-20 RX ADMIN — HYDROMORPHONE HYDROCHLORIDE 30 MILLILITER(S): 2 INJECTION INTRAMUSCULAR; INTRAVENOUS; SUBCUTANEOUS at 08:31

## 2020-02-20 RX ADMIN — Medication 15 MILLIGRAM(S): at 19:13

## 2020-02-20 RX ADMIN — HEPARIN SODIUM 5000 UNIT(S): 5000 INJECTION INTRAVENOUS; SUBCUTANEOUS at 06:22

## 2020-02-20 RX ADMIN — HYDROMORPHONE HYDROCHLORIDE 30 MILLILITER(S): 2 INJECTION INTRAMUSCULAR; INTRAVENOUS; SUBCUTANEOUS at 20:11

## 2020-02-21 ENCOUNTER — TRANSCRIPTION ENCOUNTER (OUTPATIENT)
Age: 70
End: 2020-02-21

## 2020-02-21 LAB
ANION GAP SERPL CALC-SCNC: 11 MMO/L — SIGNIFICANT CHANGE UP (ref 7–14)
BUN SERPL-MCNC: 12 MG/DL — SIGNIFICANT CHANGE UP (ref 7–23)
CALCIUM SERPL-MCNC: 8 MG/DL — LOW (ref 8.4–10.5)
CHLORIDE SERPL-SCNC: 105 MMOL/L — SIGNIFICANT CHANGE UP (ref 98–107)
CO2 SERPL-SCNC: 23 MMOL/L — SIGNIFICANT CHANGE UP (ref 22–31)
CREAT SERPL-MCNC: 0.65 MG/DL — SIGNIFICANT CHANGE UP (ref 0.5–1.3)
GLUCOSE SERPL-MCNC: 99 MG/DL — SIGNIFICANT CHANGE UP (ref 70–99)
HCT VFR BLD CALC: 29.2 % — LOW (ref 34.5–45)
HGB BLD-MCNC: 9.4 G/DL — LOW (ref 11.5–15.5)
MAGNESIUM SERPL-MCNC: 2 MG/DL — SIGNIFICANT CHANGE UP (ref 1.6–2.6)
MCHC RBC-ENTMCNC: 29.7 PG — SIGNIFICANT CHANGE UP (ref 27–34)
MCHC RBC-ENTMCNC: 32.2 % — SIGNIFICANT CHANGE UP (ref 32–36)
MCV RBC AUTO: 92.1 FL — SIGNIFICANT CHANGE UP (ref 80–100)
NRBC # FLD: 0 K/UL — SIGNIFICANT CHANGE UP (ref 0–0)
PHOSPHATE SERPL-MCNC: 2.1 MG/DL — LOW (ref 2.5–4.5)
PLATELET # BLD AUTO: 185 K/UL — SIGNIFICANT CHANGE UP (ref 150–400)
PMV BLD: 12.1 FL — SIGNIFICANT CHANGE UP (ref 7–13)
POTASSIUM SERPL-MCNC: 3.8 MMOL/L — SIGNIFICANT CHANGE UP (ref 3.5–5.3)
POTASSIUM SERPL-SCNC: 3.8 MMOL/L — SIGNIFICANT CHANGE UP (ref 3.5–5.3)
RBC # BLD: 3.17 M/UL — LOW (ref 3.8–5.2)
RBC # FLD: 13.6 % — SIGNIFICANT CHANGE UP (ref 10.3–14.5)
SODIUM SERPL-SCNC: 139 MMOL/L — SIGNIFICANT CHANGE UP (ref 135–145)
WBC # BLD: 9.59 K/UL — SIGNIFICANT CHANGE UP (ref 3.8–10.5)
WBC # FLD AUTO: 9.59 K/UL — SIGNIFICANT CHANGE UP (ref 3.8–10.5)

## 2020-02-21 RX ORDER — SODIUM,POTASSIUM PHOSPHATES 278-250MG
1 POWDER IN PACKET (EA) ORAL
Refills: 0 | Status: COMPLETED | OUTPATIENT
Start: 2020-02-21 | End: 2020-02-21

## 2020-02-21 RX ORDER — HYDROMORPHONE HYDROCHLORIDE 2 MG/ML
0.5 INJECTION INTRAMUSCULAR; INTRAVENOUS; SUBCUTANEOUS
Refills: 0 | Status: DISCONTINUED | OUTPATIENT
Start: 2020-02-21 | End: 2020-02-23

## 2020-02-21 RX ORDER — ACETAMINOPHEN 500 MG
975 TABLET ORAL EVERY 6 HOURS
Refills: 0 | Status: COMPLETED | OUTPATIENT
Start: 2020-02-21 | End: 2020-02-23

## 2020-02-21 RX ORDER — OXYCODONE HYDROCHLORIDE 5 MG/1
7.5 TABLET ORAL
Refills: 0 | Status: DISCONTINUED | OUTPATIENT
Start: 2020-02-21 | End: 2020-02-23

## 2020-02-21 RX ORDER — ENOXAPARIN SODIUM 100 MG/ML
40 INJECTION SUBCUTANEOUS DAILY
Refills: 0 | Status: DISCONTINUED | OUTPATIENT
Start: 2020-02-21 | End: 2020-02-23

## 2020-02-21 RX ORDER — OXYCODONE HYDROCHLORIDE 5 MG/1
5 TABLET ORAL
Refills: 0 | Status: DISCONTINUED | OUTPATIENT
Start: 2020-02-21 | End: 2020-02-23

## 2020-02-21 RX ORDER — ACETAMINOPHEN 500 MG
650 TABLET ORAL EVERY 6 HOURS
Refills: 0 | Status: DISCONTINUED | OUTPATIENT
Start: 2020-02-21 | End: 2020-02-21

## 2020-02-21 RX ORDER — OXYCODONE HYDROCHLORIDE 5 MG/1
5 TABLET ORAL EVERY 6 HOURS
Refills: 0 | Status: DISCONTINUED | OUTPATIENT
Start: 2020-02-21 | End: 2020-02-21

## 2020-02-21 RX ADMIN — Medication 1000 MILLIGRAM(S): at 01:05

## 2020-02-21 RX ADMIN — Medication 400 MILLIGRAM(S): at 00:35

## 2020-02-21 RX ADMIN — Medication 975 MILLIGRAM(S): at 11:40

## 2020-02-21 RX ADMIN — Medication 15 MILLIGRAM(S): at 21:49

## 2020-02-21 RX ADMIN — HYDROMORPHONE HYDROCHLORIDE 30 MILLILITER(S): 2 INJECTION INTRAMUSCULAR; INTRAVENOUS; SUBCUTANEOUS at 08:53

## 2020-02-21 RX ADMIN — Medication 975 MILLIGRAM(S): at 18:00

## 2020-02-21 RX ADMIN — Medication 15 MILLIGRAM(S): at 21:19

## 2020-02-21 RX ADMIN — PANTOPRAZOLE SODIUM 40 MILLIGRAM(S): 20 TABLET, DELAYED RELEASE ORAL at 06:00

## 2020-02-21 RX ADMIN — Medication 15 MILLIGRAM(S): at 04:16

## 2020-02-21 RX ADMIN — ESCITALOPRAM OXALATE 10 MILLIGRAM(S): 10 TABLET, FILM COATED ORAL at 11:10

## 2020-02-21 RX ADMIN — OXYCODONE HYDROCHLORIDE 5 MILLIGRAM(S): 5 TABLET ORAL at 20:12

## 2020-02-21 RX ADMIN — Medication 975 MILLIGRAM(S): at 17:29

## 2020-02-21 RX ADMIN — OXYCODONE HYDROCHLORIDE 5 MILLIGRAM(S): 5 TABLET ORAL at 19:42

## 2020-02-21 RX ADMIN — HEPARIN SODIUM 5000 UNIT(S): 5000 INJECTION INTRAVENOUS; SUBCUTANEOUS at 05:59

## 2020-02-21 RX ADMIN — HEPARIN SODIUM 5000 UNIT(S): 5000 INJECTION INTRAVENOUS; SUBCUTANEOUS at 00:35

## 2020-02-21 RX ADMIN — Medication 1 TABLET(S): at 11:12

## 2020-02-21 RX ADMIN — ALVIMOPAN 12 MILLIGRAM(S): 12 CAPSULE ORAL at 17:31

## 2020-02-21 RX ADMIN — Medication 15 MILLIGRAM(S): at 03:46

## 2020-02-21 RX ADMIN — ALVIMOPAN 12 MILLIGRAM(S): 12 CAPSULE ORAL at 07:42

## 2020-02-21 RX ADMIN — Medication 975 MILLIGRAM(S): at 11:10

## 2020-02-21 NOTE — DISCHARGE NOTE PROVIDER - NSDCMRMEDTOKEN_GEN_ALL_CORE_FT
Lexapro 10 mg oral tablet: 1 tab(s) orally once a day  Xanax 0.25 mg oral tablet: 1 tab(s) orally once a day, As Needed Lexapro 10 mg oral tablet: 1 tab(s) orally once a day  oxyCODONE 5 mg oral tablet: 1 tab(s) orally every 6 hours MDD:4 tabs  Xanax 0.25 mg oral tablet: 1 tab(s) orally once a day, As Needed acetaminophen 325 mg oral tablet: 2 tab(s) orally every 6 hours  ibuprofen 400 mg oral tablet: 1 tab(s) orally every 6 hours  Lexapro 10 mg oral tablet: 1 tab(s) orally once a day  oxyCODONE 5 mg oral tablet: 1 tab(s) orally every 6 hours MDD:4 tabs  Xanax 0.25 mg oral tablet: 1 tab(s) orally once a day, As Needed

## 2020-02-21 NOTE — DISCHARGE NOTE PROVIDER - CARE PROVIDER_API CALL
Homero Conley)  ColonRectal Surgery; Surgery  [address] [place] [address]  Phone: [phone]  Fax: [phone]  Follow Up Time:

## 2020-02-21 NOTE — DISCHARGE NOTE PROVIDER - NSDCHHNEEDSERVICE_GEN_ALL_CORE
Rehabilitation services/Other, specify... Rehabilitation services/Wound care and assessment/Other, specify...

## 2020-02-21 NOTE — DISCHARGE NOTE PROVIDER - NSDCFUADDINST_GEN_ALL_CORE_FT
WOUND CARE:  Please keep incisions clean and dry. Please do not Scrub or rub incisions. Do not use lotion or powder on incisions.     WOUND CARE INSTRUCTIONS:  Step 1: place saline-soaked gauze into abdominal wound.  Step 2: place regular dry 4x4 gauze overtop the abdominal wound.   Step 3: place paper tape over that.   Step 4: change dressing every other day. Thank you!    BATHING: You may shower and/or sponge bathe. You may use warm soapy water in the shower and rinse, pat dry.  ACTIVITY: No heavy lifting or straining. Otherwise, you may return to your usual level of physical activity. If you are taking narcotic pain medication DO NOT drive a car, operate machinery or make important decisions.  DIET: Return to your usual diet.  NOTIFY YOUR SURGEON IF YOU HAVE: any bleeding that does not stop, any pus draining from your wound(s), any fever (over 100.4 F) persistent nausea/vomiting, or if your pain is not controlled on your discharge pain medications, unable to urinate.  Please follow up with your primary care physician in one week regarding your hospitalization, bring copies of your discharge paperwork.  Please follow up with your surgeon, Dr. Conley. WOUND CARE:  Please keep incisions clean and dry. Please do not Scrub or rub incisions. Do not use lotion or powder on incisions.     WOUND CARE INSTRUCTIONS:  Step 1: place 1/2-inch plain packing in abdominal midline incision and ostomy site.  Step 2: cover with dry 4x4.  Step 3: place paper tape over that.   Step 4: change dressing daily.    BATHING: You may shower and/or sponge bathe. You may use warm soapy water in the shower and rinse, pat dry.  ACTIVITY: No heavy lifting or straining. Otherwise, you may return to your usual level of physical activity. If you are taking narcotic pain medication DO NOT drive a car, operate machinery or make important decisions.  DIET: Return to your usual diet.  NOTIFY YOUR SURGEON IF YOU HAVE: any bleeding that does not stop, any pus draining from your wound(s), any fever (over 100.4 F) persistent nausea/vomiting, or if your pain is not controlled on your discharge pain medications, unable to urinate.  Please follow up with your primary care physician in one week regarding your hospitalization, bring copies of your discharge paperwork.  Please follow up with your surgeon, Dr. Conley.

## 2020-02-21 NOTE — DISCHARGE NOTE PROVIDER - HOSPITAL COURSE
Patient was admitted on 2/18/2020 for scheduled surgery.  On 2/18/2020, patient had a reversal of sergo's procedure.  Patient had Danielle vac placed to assist in wound healing.  Patient tolerated the surgery well.  Her pain was controlled post-op with an IV PCA which was discontinued on 2/21/2020.  Patient is tolerating a regular diet and is stable to be discharged with follow up with Dr. Conley outpatient. 69y.o. female with a history of a Isaias's Procedure done on 11/2019 was admitted on 2/18/2020 for scheduled surgery: reversal of colostomy.  On 2/18/2020, patient had a reversal of Isaias's procedure.  Patient had Danielle vac placed to assist in wound healing as well as MEMO drains that were placed in the O.R.  Patient tolerated the surgery well.  Her diet was advanced gradually as tolerated. Her pain was controlled post-op with an IV PCA which was discontinued on 2/21/2020.  Patient is tolerating a regular diet and is stable to be discharged with follow up with Dr. Conley outpatient. 69y.o. female with a history of a Isaias's Procedure done on 11/2019 was admitted on 2/18/2020 for scheduled surgery: reversal of colostomy.  On 2/18/2020, patient had a reversal of Isaias's procedure.  Patient had Danielle vac placed to assist in wound healing as well as MEMO drains that were placed in the O.R.  Patient tolerated the surgery well.  Her diet was advanced gradually as tolerated. Her pain was controlled post-op with an IV PCA which was discontinued on 2/21/2020.  Patient is tolerating a regular diet and is stable to be discharged with follow up with Dr. Conley outpatient. MEMO discontinued before discharge and she has been set up with visiting nursing services.

## 2020-02-22 RX ORDER — IBUPROFEN 200 MG
400 TABLET ORAL EVERY 6 HOURS
Refills: 0 | Status: DISCONTINUED | OUTPATIENT
Start: 2020-02-22 | End: 2020-02-23

## 2020-02-22 RX ORDER — ONDANSETRON 8 MG/1
4 TABLET, FILM COATED ORAL ONCE
Refills: 0 | Status: COMPLETED | OUTPATIENT
Start: 2020-02-22 | End: 2020-02-22

## 2020-02-22 RX ORDER — OXYCODONE HYDROCHLORIDE 5 MG/1
1 TABLET ORAL
Qty: 20 | Refills: 0
Start: 2020-02-22 | End: 2020-02-26

## 2020-02-22 RX ADMIN — Medication 975 MILLIGRAM(S): at 00:59

## 2020-02-22 RX ADMIN — Medication 400 MILLIGRAM(S): at 17:36

## 2020-02-22 RX ADMIN — Medication 975 MILLIGRAM(S): at 06:30

## 2020-02-22 RX ADMIN — ESCITALOPRAM OXALATE 10 MILLIGRAM(S): 10 TABLET, FILM COATED ORAL at 11:30

## 2020-02-22 RX ADMIN — PANTOPRAZOLE SODIUM 40 MILLIGRAM(S): 20 TABLET, DELAYED RELEASE ORAL at 06:00

## 2020-02-22 RX ADMIN — ALVIMOPAN 12 MILLIGRAM(S): 12 CAPSULE ORAL at 06:00

## 2020-02-22 RX ADMIN — Medication 975 MILLIGRAM(S): at 06:00

## 2020-02-22 RX ADMIN — ONDANSETRON 4 MILLIGRAM(S): 8 TABLET, FILM COATED ORAL at 22:43

## 2020-02-22 RX ADMIN — ENOXAPARIN SODIUM 40 MILLIGRAM(S): 100 INJECTION SUBCUTANEOUS at 11:31

## 2020-02-22 RX ADMIN — Medication 975 MILLIGRAM(S): at 00:29

## 2020-02-22 RX ADMIN — ALVIMOPAN 12 MILLIGRAM(S): 12 CAPSULE ORAL at 17:36

## 2020-02-22 RX ADMIN — Medication 400 MILLIGRAM(S): at 19:05

## 2020-02-22 RX ADMIN — Medication 15 MILLIGRAM(S): at 11:31

## 2020-02-22 RX ADMIN — Medication 15 MILLIGRAM(S): at 12:30

## 2020-02-22 RX ADMIN — Medication 975 MILLIGRAM(S): at 17:36

## 2020-02-22 RX ADMIN — Medication 975 MILLIGRAM(S): at 19:05

## 2020-02-22 RX ADMIN — Medication 975 MILLIGRAM(S): at 12:30

## 2020-02-22 RX ADMIN — Medication 975 MILLIGRAM(S): at 11:31

## 2020-02-23 ENCOUNTER — TRANSCRIPTION ENCOUNTER (OUTPATIENT)
Age: 70
End: 2020-02-23

## 2020-02-23 VITALS
SYSTOLIC BLOOD PRESSURE: 119 MMHG | DIASTOLIC BLOOD PRESSURE: 63 MMHG | HEART RATE: 74 BPM | OXYGEN SATURATION: 98 % | TEMPERATURE: 98 F | RESPIRATION RATE: 18 BRPM

## 2020-02-23 RX ORDER — OXYCODONE HYDROCHLORIDE 5 MG/1
5 TABLET ORAL EVERY 4 HOURS
Refills: 0 | Status: DISCONTINUED | OUTPATIENT
Start: 2020-02-23 | End: 2020-02-23

## 2020-02-23 RX ORDER — ACETAMINOPHEN 500 MG
650 TABLET ORAL EVERY 6 HOURS
Refills: 0 | Status: DISCONTINUED | OUTPATIENT
Start: 2020-02-23 | End: 2020-02-23

## 2020-02-23 RX ORDER — ACETAMINOPHEN 500 MG
2 TABLET ORAL
Qty: 0 | Refills: 0 | DISCHARGE
Start: 2020-02-23

## 2020-02-23 RX ORDER — IBUPROFEN 200 MG
1 TABLET ORAL
Qty: 0 | Refills: 0 | DISCHARGE
Start: 2020-02-23

## 2020-02-23 RX ADMIN — Medication 400 MILLIGRAM(S): at 12:00

## 2020-02-23 RX ADMIN — Medication 650 MILLIGRAM(S): at 11:29

## 2020-02-23 RX ADMIN — Medication 975 MILLIGRAM(S): at 06:25

## 2020-02-23 RX ADMIN — Medication 400 MILLIGRAM(S): at 11:29

## 2020-02-23 RX ADMIN — ESCITALOPRAM OXALATE 10 MILLIGRAM(S): 10 TABLET, FILM COATED ORAL at 11:28

## 2020-02-23 RX ADMIN — Medication 975 MILLIGRAM(S): at 00:08

## 2020-02-23 RX ADMIN — Medication 975 MILLIGRAM(S): at 00:38

## 2020-02-23 RX ADMIN — PANTOPRAZOLE SODIUM 40 MILLIGRAM(S): 20 TABLET, DELAYED RELEASE ORAL at 06:25

## 2020-02-23 RX ADMIN — Medication 400 MILLIGRAM(S): at 00:38

## 2020-02-23 RX ADMIN — ENOXAPARIN SODIUM 40 MILLIGRAM(S): 100 INJECTION SUBCUTANEOUS at 11:28

## 2020-02-23 RX ADMIN — Medication 650 MILLIGRAM(S): at 12:00

## 2020-02-23 RX ADMIN — Medication 400 MILLIGRAM(S): at 06:55

## 2020-02-23 RX ADMIN — Medication 400 MILLIGRAM(S): at 06:25

## 2020-02-23 RX ADMIN — Medication 400 MILLIGRAM(S): at 00:08

## 2020-02-23 RX ADMIN — Medication 975 MILLIGRAM(S): at 06:55

## 2020-02-23 NOTE — PROGRESS NOTE ADULT - ASSESSMENT
Assessment:  69F with PMH of depression/anxiety, s/p Pacheco procedure for perforated bowel 2/2 colonoscopy s/p colostomy reversal (2/18).     Plan:  - PO pain meds  - LRD  - DVT ppx  - OOB and ambulating as tolerated  - VNS needed for packings.   - d/c today    A Team  #[id number]
Assessment:  69F with PMH of depression/anxiety, s/p Pacheco procedure for perforated bowel 2/2 colonoscopy, now POD2, s/p colostomy reversal (2/18)    Plan:  - Pain control as needed with standing IV Ofirmev/PCA  - Continue home Lexapro  - continue with CLD  - DVT ppx  - OOB and ambulating as tolerated    A Team  #[id number]
Assessment:  69F with PMH of depression/anxiety, s/p Pacheco procedure for perforated bowel 2/2 colonoscopy s/p colostomy reversal (2/18).     Plan:  - PO pain meds  - LRD  - DVT ppx  - OOB and ambulating as tolerated  - VNS needed for packings.   - d/c tomorrow     A Team  #[id number]
69y Female s/p colostomy reversal (2/18).     Plan:  - Pain control as needed with standing IV Ofirmev/PCA  - Continue home Lexapro  - adv to clears  - DVT ppx  - PT eval  - d/c interiano  - OOB and ambulating as tolerated  - F/u AM labs    A Team  #[id number]
Assessment:  69F with PMH of depression/anxiety, s/p Pacheco procedure for perforated bowel 2/2 colonoscopy s/p colostomy reversal (2/18).     Plan:  - d/c PCA and PO pain meds.   - Continue home Lexapro  - LRD  - DVT ppx  - OOB and ambulating as tolerated    A Team  #[id number]

## 2020-02-23 NOTE — PROGRESS NOTE ADULT - SUBJECTIVE AND OBJECTIVE BOX
A TEAM SURGERY DAILY PROGRESS NOTE:    Subjective:  Patient seen and examined on AM rounds.   +/+   no nausea  pain well-controlled    Vital Signs Last 24 Hrs  T(C): 36.7 (21 Feb 2020 05:57), Max: 37 (20 Feb 2020 22:19)  T(F): 98.1 (21 Feb 2020 05:57), Max: 98.6 (20 Feb 2020 22:19)  HR: 68 (21 Feb 2020 05:57) (68 - 88)  BP: 146/90 (21 Feb 2020 05:57) (122/53 - 153/78)  BP(mean): --  RR: 16 (21 Feb 2020 05:57) (16 - 18)  SpO2: 98% (21 Feb 2020 05:57) (97% - 99%)    Physical Exam:  General: lying in bed, awake, alert & fully oriented, no acute distress   Pulmonary: Respirations unlabored, clear bilaterally  Abdominal: Soft, appropriately tender, provena VAC in place holding suction, JPx1 RLQ w/ serosang output  Extremities: warm and well perfused    ** I&O's **  20 Feb 2020 07:01  -  21 Feb 2020 07:00  --------------------------------------------------------  IN:    dextrose 5% + sodium chloride 0.45% with potassium chloride 20 mEq/L: 1200 mL    IV PiggyBack: 700 mL    Oral Fluid: 690 mL  Total IN: 2590 mL    OUT:    Bulb: 135 mL    Voided: 1300 mL  Total OUT: 1435 mL    Total NET: 1155 mL          ** LABS **                        8.6    10.45 )-----------( 151      ( 20 Feb 2020 07:00 )             27.0     20 Feb 2020 07:00    136    |  102    |  13     ----------------------------<  113    4.1     |  24     |  0.70     Ca    7.9        20 Feb 2020 07:00  Phos  1.5       20 Feb 2020 07:00  Mg     2.2       20 Feb 2020 07:00        CAPILLARY BLOOD GLUCOSE                    MEDICATIONS  (STANDING):  acetaminophen  IVPB .. 1000 milliGRAM(s) IV Intermittent every 6 hours  alvimopan 12 milliGRAM(s) Oral two times a day  dextrose 5% + sodium chloride 0.45% with potassium chloride 20 mEq/L 1000 milliLiter(s) (50 mL/Hr) IV Continuous <Continuous>  escitalopram 10 milliGRAM(s) Oral daily  heparin  Injectable 5000 Unit(s) SubCutaneous every 8 hours  HYDROmorphone PCA (1 mG/mL) 30 milliLiter(s) PCA Continuous PCA Continuous  ketorolac   Injectable 15 milliGRAM(s) IV Push every 6 hours  pantoprazole    Tablet 40 milliGRAM(s) Oral before breakfast    MEDICATIONS  (PRN):  naloxone Injectable 0.1 milliGRAM(s) IV Push every 3 minutes PRN For ANY of the following changes in patient status:  A. RR LESS THAN 10 breaths per minute, B. Oxygen saturation LESS THAN 90%, C. Sedation score of 6  ondansetron Injectable 4 milliGRAM(s) IV Push every 6 hours PRN Nausea
Anesthesia Pain Management Service    SUBJECTIVE: Patient is doing well with IV PCA and no significant problems reported.    Pain Scale Score	At rest: _2__ 	With Activity: ___ 	[X ] Refer to charted pain scores    THERAPY:    [ ] IV PCA Morphine		[ ] 5 mg/mL	[ ] 1 mg/mL  [X ] IV PCA Hydromorphone	[ ] 5 mg/mL	[X ] 1 mg/mL  [ ] IV PCA Fentanyl		[ ] 50 micrograms/mL    Demand dose __0.2_ lockout __6_ (minutes) Continuous Rate _0__ Total: _2.8__  mg used (in past 24 hours)      MEDICATIONS  (STANDING):  acetaminophen   Tablet .. 975 milliGRAM(s) Oral every 6 hours  acetaminophen  IVPB .. 1000 milliGRAM(s) IV Intermittent every 6 hours  alvimopan 12 milliGRAM(s) Oral two times a day  dextrose 5% + sodium chloride 0.45% with potassium chloride 20 mEq/L 1000 milliLiter(s) (50 mL/Hr) IV Continuous <Continuous>  escitalopram 10 milliGRAM(s) Oral daily  heparin  Injectable 5000 Unit(s) SubCutaneous every 8 hours  HYDROmorphone PCA (1 mG/mL) 30 milliLiter(s) PCA Continuous PCA Continuous  magnesium sulfate  IVPB 2 Gram(s) IV Intermittent once  pantoprazole    Tablet 40 milliGRAM(s) Oral before breakfast    MEDICATIONS  (PRN):  naloxone Injectable 0.1 milliGRAM(s) IV Push every 3 minutes PRN For ANY of the following changes in patient status:  A. RR LESS THAN 10 breaths per minute, B. Oxygen saturation LESS THAN 90%, C. Sedation score of 6  ondansetron Injectable 4 milliGRAM(s) IV Push every 6 hours PRN Nausea      OBJECTIVE: laying in bed     Sedation Score:	[ X] Alert	[ ] Drowsy 	[ ] Arousable	[ ] Asleep	[ ] Unresponsive    Side Effects:	[X ] None	[ ] Nausea	[ ] Vomiting	[ ] Pruritus  		[ ] Other:    Vital Signs Last 24 Hrs  T(C): 37.1 (19 Feb 2020 06:15), Max: 37.1 (19 Feb 2020 06:15)  T(F): 98.7 (19 Feb 2020 06:15), Max: 98.7 (19 Feb 2020 06:15)  HR: 84 (19 Feb 2020 06:15) (80 - 92)  BP: 116/60 (19 Feb 2020 06:15) (116/60 - 140/61)  BP(mean): 80 (18 Feb 2020 14:00) (69 - 103)  RR: 16 (19 Feb 2020 06:15) (11 - 28)  SpO2: 98% (19 Feb 2020 06:15) (96% - 100%)    ASSESSMENT/ PLAN    Therapy to  be:	[ X] Continue   [ ] Discontinued   [ ] Change to prn Analgesics    Documentation and Verification of current medications:   [X] Done	[ ] Not done, not elligible    Comments: pt. just advanced to clears, will f/u later today. Continue current therapy     Progress Note written now but Patient was seen earlier.
Anesthesia Pain Management Service    SUBJECTIVE: Pt doing well with IV PCA without problems reported.    Therapy:	  [ X] IV PCA	   [ ] Epidural           [ ] s/p Spinal Opoid              [ ] Postpartum infusion	  [ ] Patient controlled regional anesthesia (PCRA)    [ ] prn Analgesics    Allergies    No Known Allergies    Intolerances      MEDICATIONS  (STANDING):  acetaminophen  IVPB .. 1000 milliGRAM(s) IV Intermittent every 6 hours  alvimopan 12 milliGRAM(s) Oral two times a day  dextrose 5% + sodium chloride 0.45% with potassium chloride 20 mEq/L 1000 milliLiter(s) (50 mL/Hr) IV Continuous <Continuous>  escitalopram 10 milliGRAM(s) Oral daily  heparin  Injectable 5000 Unit(s) SubCutaneous every 8 hours  HYDROmorphone PCA (1 mG/mL) 30 milliLiter(s) PCA Continuous PCA Continuous  ketorolac   Injectable 15 milliGRAM(s) IV Push every 6 hours  pantoprazole    Tablet 40 milliGRAM(s) Oral before breakfast    MEDICATIONS  (PRN):  naloxone Injectable 0.1 milliGRAM(s) IV Push every 3 minutes PRN For ANY of the following changes in patient status:  A. RR LESS THAN 10 breaths per minute, B. Oxygen saturation LESS THAN 90%, C. Sedation score of 6  ondansetron Injectable 4 milliGRAM(s) IV Push every 6 hours PRN Nausea      OBJECTIVE:   [X] No new signs     [ ] Other:    Side Effects:  [X ] None			[ ] Other:    Assessment of Catheter Site:		[ ] Intact		[ ] Other:    ASSESSMENT/PLAN  [ X] Continue current therapy. Recommend non-opioid adjuvant analgesics to be used when possible and when allowed by primary surgical team.    [ ] Therapy changed to:    [ ] IV PCA       [ ] Epidural     [ ] prn Analgesics     Comments:    Progress Note written now but Patient was seen earlier.
VSS, regained bowel fuction. Pervena vac in place. To advance diet D/C pervena tomarrow. Likely discharge. Dheld
A TEAM SURGERY DAILY PROGRESS NOTE:    Subjective:  Patient seen and examined on AM rounds.   +/+   no nausea  pain well-controlled    Vital Signs Last 24 Hrs  T(C): 36.8 (23 Feb 2020 06:00), Max: 36.9 (22 Feb 2020 17:40)  T(F): 98.2 (23 Feb 2020 06:00), Max: 98.4 (22 Feb 2020 17:40)  HR: 84 (23 Feb 2020 06:00) (79 - 86)  BP: 146/77 (23 Feb 2020 06:00) (113/47 - 146/77)  BP(mean): --  RR: 16 (23 Feb 2020 06:00) (16 - 18)  SpO2: 98% (23 Feb 2020 06:00) (98% - 100%)    Physical Exam:  General: lying in bed, awake, alert & fully oriented, no acute distress   Pulmonary: Respirations unlabored, clear bilaterally  Abdominal: Soft, appropriately tender, provena vac taken down, JPx1 RLQ w/ serosang output  Extremities: warm and well perfused    ** I&O's **  22 Feb 2020 07:01  -  23 Feb 2020 07:00  --------------------------------------------------------  IN:    Oral Fluid: 150 mL  Total IN: 150 mL    OUT:    Bulb: 45 mL    Voided: 850 mL  Total OUT: 895 mL    Total NET: -745 mL          ** LABS **            CAPILLARY BLOOD GLUCOSE                    MEDICATIONS  (STANDING):  enoxaparin Injectable 40 milliGRAM(s) SubCutaneous daily  escitalopram 10 milliGRAM(s) Oral daily  ibuprofen  Tablet. 400 milliGRAM(s) Oral every 6 hours  pantoprazole    Tablet 40 milliGRAM(s) Oral before breakfast    MEDICATIONS  (PRN):  oxyCODONE    IR 5 milliGRAM(s) Oral every 3 hours PRN Moderate Pain (4 - 6)  oxyCODONE    IR 7.5 milliGRAM(s) Oral every 3 hours PRN Severe Pain (7 - 10)
ANESTHESIA POSTOP CHECK    69y Female POSTOP DAY 1 S/P colostomy reversal, takedown of splenic flexure    Vital Signs Last 24 Hrs  T(C): 37.1 (19 Feb 2020 06:15), Max: 37.1 (19 Feb 2020 06:15)  T(F): 98.7 (19 Feb 2020 06:15), Max: 98.7 (19 Feb 2020 06:15)  HR: 84 (19 Feb 2020 06:15) (80 - 92)  BP: 116/60 (19 Feb 2020 06:15) (116/60 - 140/61)  BP(mean): 80 (18 Feb 2020 14:00) (69 - 103)  RR: 16 (19 Feb 2020 06:15) (11 - 28)  SpO2: 98% (19 Feb 2020 06:15) (96% - 100%)  I&O's Summary    18 Feb 2020 07:01  -  19 Feb 2020 07:00  --------------------------------------------------------  IN: 2375 mL / OUT: 2438 mL / NET: -63 mL        [x] NO APPARENT ANESTHESIA COMPLICATIONS      Yassine Morales MD pgy-2  Pager [phone] / [phone]
Anesthesia Pain Management Service    SUBJECTIVE: Patient is doing well with IV PCA and no significant problems reported.    Pain Scale Score	At rest: _2__ 	With Activity: ___ 	[X ] Refer to charted pain scores    THERAPY:    [ ] IV PCA Morphine		[ ] 5 mg/mL	[ ] 1 mg/mL  [X ] IV PCA Hydromorphone	[ ] 5 mg/mL	[X ] 1 mg/mL  [ ] IV PCA Fentanyl		[ ] 50 micrograms/mL    Demand dose __0.2_ lockout __6_ (minutes) Continuous Rate _0__ Total: __1_  mg used (in past 24 hours)      MEDICATIONS  (STANDING):  acetaminophen  IVPB .. 1000 milliGRAM(s) IV Intermittent every 6 hours  acetaminophen  IVPB .. 1000 milliGRAM(s) IV Intermittent every 6 hours  alvimopan 12 milliGRAM(s) Oral two times a day  dextrose 5% + sodium chloride 0.45% with potassium chloride 20 mEq/L 1000 milliLiter(s) (50 mL/Hr) IV Continuous <Continuous>  escitalopram 10 milliGRAM(s) Oral daily  heparin  Injectable 5000 Unit(s) SubCutaneous every 8 hours  HYDROmorphone PCA (1 mG/mL) 30 milliLiter(s) PCA Continuous PCA Continuous  pantoprazole    Tablet 40 milliGRAM(s) Oral before breakfast  sodium phosphate IVPB 30 milliMole(s) IV Intermittent once    MEDICATIONS  (PRN):  naloxone Injectable 0.1 milliGRAM(s) IV Push every 3 minutes PRN For ANY of the following changes in patient status:  A. RR LESS THAN 10 breaths per minute, B. Oxygen saturation LESS THAN 90%, C. Sedation score of 6  ondansetron Injectable 4 milliGRAM(s) IV Push every 6 hours PRN Nausea      OBJECTIVE: sitting up in bed     Sedation Score:	[ X] Alert	[ ] Drowsy 	[ ] Arousable	[ ] Asleep	[ ] Unresponsive    Side Effects:	[X ] None	[ ] Nausea	[ ] Vomiting	[ ] Pruritus  		[ ] Other:    Vital Signs Last 24 Hrs  T(C): 36.8 (20 Feb 2020 09:53), Max: 36.8 (20 Feb 2020 02:05)  T(F): 98.2 (20 Feb 2020 09:53), Max: 98.3 (20 Feb 2020 02:05)  HR: 87 (20 Feb 2020 09:53) (77 - 95)  BP: 147/79 (20 Feb 2020 09:53) (141/71 - 158/66)  BP(mean): --  RR: 18 (20 Feb 2020 09:53) (16 - 19)  SpO2: 98% (20 Feb 2020 09:53) (98% - 99%)    ASSESSMENT/ PLAN    Therapy to  be:	[ X] Continue   [ ] Discontinued   [ ] Change to prn Analgesics    Documentation and Verification of current medications:   [X] Done	[ ] Not done, not elligible    Comments: still not tolerating PO diet, continue current therapy     Progress Note written now but Patient was seen earlier.
Anesthesia Pain Management Service    SUBJECTIVE: Pt reports she does well with IV tylenol and prefers to not use the PCA. Refer to charted pain scores.    THERAPY:    [ ] IV PCA Morphine		[ ] 5 mg/mL	[ ] 1 mg/mL  [X] IV PCA Hydromorphone	[ ] 5 mg/mL	[X] 1 mg/mL  [ ] IV PCA Fentanyl		[ ] 50 micrograms/mL    Demand dose: 0.2 mg     Lockout: 6 minutes   Continuous Rate: 0 mg/hr  4 Hour Limit: 4 mg  24 Hour Total: 0.6 mg    MEDICATIONS  (STANDING):  acetaminophen   Tablet .. 975 milliGRAM(s) Oral every 6 hours  alvimopan 12 milliGRAM(s) Oral two times a day  dextrose 5% + sodium chloride 0.45% with potassium chloride 20 mEq/L 1000 milliLiter(s) (50 mL/Hr) IV Continuous <Continuous>  escitalopram 10 milliGRAM(s) Oral daily  heparin  Injectable 5000 Unit(s) SubCutaneous every 8 hours  ketorolac   Injectable 15 milliGRAM(s) IV Push every 6 hours  pantoprazole    Tablet 40 milliGRAM(s) Oral before breakfast  potassium acid phosphate/sodium acid phosphate tablet (K-PHOS No. 2) 1 Tablet(s) Oral four times a day with meals    MEDICATIONS  (PRN):  HYDROmorphone  Injectable 0.5 milliGRAM(s) IV Push every 3 hours PRN breakthrough  ondansetron Injectable 4 milliGRAM(s) IV Push every 6 hours PRN Nausea  oxyCODONE    IR 5 milliGRAM(s) Oral every 3 hours PRN Moderate Pain (4 - 6)  oxyCODONE    IR 7.5 milliGRAM(s) Oral every 3 hours PRN Severe Pain (7 - 10)      OBJECTIVE:    Sedation Score:	[ X] Alert	[ ] Drowsy 	[ ] Arousable	[ ] Asleep	[ ] Unresponsive    Side Effects:	[X ] None	[ ] Nausea	[ ] Vomiting	[ ] Pruritus  		[ ] Other:    Vital Signs Last 24 Hrs  T(C): 36.7 (21 Feb 2020 09:49), Max: 37 (20 Feb 2020 22:19)  T(F): 98 (21 Feb 2020 09:49), Max: 98.6 (20 Feb 2020 22:19)  HR: 80 (21 Feb 2020 09:49) (68 - 88)  BP: 133/52 (21 Feb 2020 09:49) (122/53 - 153/78)  BP(mean): --  RR: 16 (21 Feb 2020 09:49) (16 - 18)  SpO2: 98% (21 Feb 2020 09:49) (97% - 99%)    ASSESSMENT/ PLAN    Therapy to  be:               [ ] Continued   [X] Discontinued   [X] Changed to PRN Analgesics    Documentation and Verification of current medications:   [X] Done	[ ] Not done, not eligible    Comments: PCA dc'd. Changed pt to standing PO acetaminophen q6h, PRN oxy 5/7.5 q3h, breakthrough IV dilaudid 0.5 mg q3h.    Yassine Morales MD pgy-2  Pager [phone] / [phone]
SURGERY PROGRESS NOTE    SUBJECTIVE / 24H EVENTS:  Patient seen and examined on morning rounds.   -/-  no n/v  pain well controlled      OBJECTIVE:  VITAL SIGNS:  T(C): 37.1 (02-19-20 @ 06:15), Max: 37.1 (02-19-20 @ 06:15)  HR: 84 (02-19-20 @ 06:15) (80 - 92)  BP: 116/60 (02-19-20 @ 06:15) (116/60 - 140/61)  RR: 16 (02-19-20 @ 06:15) (11 - 28)  SpO2: 98% (02-19-20 @ 06:15) (96% - 100%)  Daily     Daily       Physical Exam:  General: Awake, alert & fully oriented, no acute distress   Pulmonary: Respirations unlabored, clear bilaterally  Cardiovascular: s1/s2, no murmur   Abdominal: Soft, appropriately tender, provena VAC in place holding suction, JPx1 RLQ w/ serosang output  Extremities: WWP      02-18-20 @ 07:01  -  02-19-20 @ 06:41  --------------------------------------------------------  IN:    lactated ringers.: 2375 mL  Total IN: 2375 mL    OUT:    Bulb: 278 mL    Indwelling Catheter - Urethral: 2160 mL  Total OUT: 2438 mL    Total NET: -63 mL          LAB VALUES:                             MICROBIOLOGY:      RADIOLOGY:        MEDICATIONS  (STANDING):  acetaminophen  IVPB .. 1000 milliGRAM(s) IV Intermittent every 6 hours  alvimopan 12 milliGRAM(s) Oral two times a day  cefoTEtan  IVPB 2 Gram(s) IV Intermittent every 12 hours  escitalopram 10 milliGRAM(s) Oral daily  heparin  Injectable 5000 Unit(s) SubCutaneous every 8 hours  HYDROmorphone PCA (1 mG/mL) 30 milliLiter(s) PCA Continuous PCA Continuous  lactated ringers. 1000 milliLiter(s) (125 mL/Hr) IV Continuous <Continuous>    MEDICATIONS  (PRN):  naloxone Injectable 0.1 milliGRAM(s) IV Push every 3 minutes PRN For ANY of the following changes in patient status:  A. RR LESS THAN 10 breaths per minute, B. Oxygen saturation LESS THAN 90%, C. Sedation score of 6  ondansetron Injectable 4 milliGRAM(s) IV Push every 6 hours PRN Nausea
A TEAM SURGERY DAILY PROGRESS NOTE:    Subjective:  Patient seen and examined on AM rounds.   +/+   no nausea  pain well-controlled    Vital Signs Last 24 Hrs  T(C): 36.8 (22 Feb 2020 06:00), Max: 37.1 (21 Feb 2020 21:52)  T(F): 98.2 (22 Feb 2020 06:00), Max: 98.7 (21 Feb 2020 21:52)  HR: 84 (22 Feb 2020 06:00) (76 - 84)  BP: 126/67 (22 Feb 2020 06:00) (126/67 - 147/71)  BP(mean): --  RR: 16 (22 Feb 2020 06:00) (16 - 18)  SpO2: 98% (22 Feb 2020 06:00) (98% - 100%)    Physical Exam:  General: lying in bed, awake, alert & fully oriented, no acute distress   Pulmonary: Respirations unlabored, clear bilaterally  Abdominal: Soft, appropriately tender, provena vac taken down, JPx1 RLQ w/ serosang output  Extremities: warm and well perfused    ** I&O's **  21 Feb 2020 07:01  -  22 Feb 2020 07:00  --------------------------------------------------------  IN:    dextrose 5% + sodium chloride 0.45% with potassium chloride 20 mEq/L: 400 mL    Oral Fluid: 970 mL  Total IN: 1370 mL    OUT:    Bulb: 45 mL    Voided: 1700 mL  Total OUT: 1745 mL    Total NET: -375 mL          ** LABS **                        9.4    9.59  )-----------( 185      ( 21 Feb 2020 06:15 )             29.2     21 Feb 2020 06:15    139    |  105    |  12     ----------------------------<  99     3.8     |  23     |  0.65     Ca    8.0        21 Feb 2020 06:15  Phos  2.1       21 Feb 2020 06:15  Mg     2.0       21 Feb 2020 06:15        CAPILLARY BLOOD GLUCOSE                    MEDICATIONS  (STANDING):  acetaminophen   Tablet .. 975 milliGRAM(s) Oral every 6 hours  alvimopan 12 milliGRAM(s) Oral two times a day  enoxaparin Injectable 40 milliGRAM(s) SubCutaneous daily  escitalopram 10 milliGRAM(s) Oral daily  ketorolac   Injectable 15 milliGRAM(s) IV Push every 6 hours  pantoprazole    Tablet 40 milliGRAM(s) Oral before breakfast    MEDICATIONS  (PRN):  HYDROmorphone  Injectable 0.5 milliGRAM(s) IV Push every 3 hours PRN breakthrough  oxyCODONE    IR 5 milliGRAM(s) Oral every 3 hours PRN Moderate Pain (4 - 6)  oxyCODONE    IR 7.5 milliGRAM(s) Oral every 3 hours PRN Severe Pain (7 - 10)
A TEAM SURGERY DAILY PROGRESS NOTE:    Subjective:  Patient seen and examined on AM rounds. Reports less nauseous, still have some pain but controlled with medications. Reports spit-ups/emesis overnight. Otherwise, no acute event, vitals stable. - flatus, -BM    Vital Signs Last 24 Hrs  T(C): 36.8 (20 Feb 2020 09:53), Max: 36.8 (20 Feb 2020 02:05)  T(F): 98.2 (20 Feb 2020 09:53), Max: 98.3 (20 Feb 2020 02:05)  HR: 87 (20 Feb 2020 09:53) (77 - 95)  BP: 147/79 (20 Feb 2020 09:53) (141/71 - 158/66)  BP(mean): --  RR: 18 (20 Feb 2020 09:53) (16 - 19)  SpO2: 98% (20 Feb 2020 09:53) (98% - 99%)    Physical Exam:  General: lying in bed, awake, alert & fully oriented, no acute distress   Pulmonary: Respirations unlabored, clear bilaterally  Abdominal: Soft, appropriately tender, provena VAC in place holding suction, JPx1 RLQ w/ serosang output  Extremities: warm and well perfused    I&O's Detail    19 Feb 2020 07:01  -  20 Feb 2020 07:00  --------------------------------------------------------  IN:    dextrose 5% + sodium chloride 0.45% with potassium chloride 20 mEq/L: 1000 mL    IV PiggyBack: 250 mL    Oral Fluid: 595 mL  Total IN: 1845 mL    OUT:    Bulb: 100 mL    Voided: 1950 mL  Total OUT: 2050 mL    Total NET: -205 mL      20 Feb 2020 07:01  -  20 Feb 2020 10:49  --------------------------------------------------------  IN:  Total IN: 0 mL    OUT:    Voided: 100 mL  Total OUT: 100 mL    Total NET: -100 mL    Daily     Daily     MEDICATIONS  (STANDING):  acetaminophen  IVPB .. 1000 milliGRAM(s) IV Intermittent every 6 hours  acetaminophen  IVPB .. 1000 milliGRAM(s) IV Intermittent every 6 hours  alvimopan 12 milliGRAM(s) Oral two times a day  dextrose 5% + sodium chloride 0.45% with potassium chloride 20 mEq/L 1000 milliLiter(s) (50 mL/Hr) IV Continuous <Continuous>  escitalopram 10 milliGRAM(s) Oral daily  heparin  Injectable 5000 Unit(s) SubCutaneous every 8 hours  HYDROmorphone PCA (1 mG/mL) 30 milliLiter(s) PCA Continuous PCA Continuous  pantoprazole    Tablet 40 milliGRAM(s) Oral before breakfast  sodium phosphate IVPB 30 milliMole(s) IV Intermittent once    MEDICATIONS  (PRN):  naloxone Injectable 0.1 milliGRAM(s) IV Push every 3 minutes PRN For ANY of the following changes in patient status:  A. RR LESS THAN 10 breaths per minute, B. Oxygen saturation LESS THAN 90%, C. Sedation score of 6  ondansetron Injectable 4 milliGRAM(s) IV Push every 6 hours PRN Nausea      LABS:                        8.6    10.45 )-----------( 151      ( 20 Feb 2020 07:00 )             27.0     02-20    136  |  102  |  13  ----------------------------<  113<H>  4.1   |  24  |  0.70    Ca    7.9<L>      20 Feb 2020 07:00  Phos  1.5     02-20  Mg     2.2     02-20
Anesthesia Pain Management Service    SUBJECTIVE: Pt doing well with IV PCA without problems reported.    Therapy:	  [ X] IV PCA	   [ ] Epidural           [ ] s/p Spinal Opoid              [ ] Postpartum infusion	  [ ] Patient controlled regional anesthesia (PCRA)    [ ] prn Analgesics    Allergies    No Known Allergies    Intolerances      MEDICATIONS  (STANDING):  acetaminophen  IVPB .. 1000 milliGRAM(s) IV Intermittent every 6 hours  alvimopan 12 milliGRAM(s) Oral two times a day  dextrose 5% + sodium chloride 0.45% with potassium chloride 20 mEq/L 1000 milliLiter(s) (50 mL/Hr) IV Continuous <Continuous>  escitalopram 10 milliGRAM(s) Oral daily  heparin  Injectable 5000 Unit(s) SubCutaneous every 8 hours  HYDROmorphone PCA (1 mG/mL) 30 milliLiter(s) PCA Continuous PCA Continuous  pantoprazole    Tablet 40 milliGRAM(s) Oral before breakfast    MEDICATIONS  (PRN):  naloxone Injectable 0.1 milliGRAM(s) IV Push every 3 minutes PRN For ANY of the following changes in patient status:  A. RR LESS THAN 10 breaths per minute, B. Oxygen saturation LESS THAN 90%, C. Sedation score of 6  ondansetron Injectable 4 milliGRAM(s) IV Push every 6 hours PRN Nausea      OBJECTIVE:   [X] No new signs     [ ] Other:    Side Effects:  [X ] None			[ ] Other:    Assessment of Catheter Site:		[ ] Intact		[ ] Other:    ASSESSMENT/PLAN  [ X] Continue current therapy. Recommend non-opioid adjuvant analgesics to be used when possible and when allowed by primary surgical team.    [ ] Therapy changed to:    [ ] IV PCA       [ ] Epidural     [ ] prn Analgesics     Comments:    Progress Note written now but Patient was seen earlier.

## 2020-02-23 NOTE — DISCHARGE NOTE NURSING/CASE MANAGEMENT/SOCIAL WORK - PATIENT PORTAL LINK FT
You can access the FollowMyHealth Patient Portal offered by [facility] by registering at the following website: [email or web address]. By joining [employer]’s FollowMyHealth portal, you will also be able to view your health information using other applications (apps) compatible with our system.

## 2020-02-23 NOTE — PROGRESS NOTE ADULT - PROVIDER SPECIALTY LIST ADULT
Anesthesia
Pain Medicine
Pain Medicine
Surgery
Surgery
Pain Medicine
Pain Medicine
Surgery
Pain Medicine
Surgery

## 2020-02-27 LAB
SURGICAL PATHOLOGY STUDY: SIGNIFICANT CHANGE UP

## 2020-03-09 ENCOUNTER — APPOINTMENT (OUTPATIENT)
Dept: SURGERY | Facility: CLINIC | Age: 70
End: 2020-03-09
Payer: MEDICARE

## 2020-03-09 VITALS
TEMPERATURE: 97.6 F | HEIGHT: 68 IN | HEART RATE: 87 BPM | WEIGHT: 150 LBS | DIASTOLIC BLOOD PRESSURE: 76 MMHG | SYSTOLIC BLOOD PRESSURE: 137 MMHG | BODY MASS INDEX: 22.73 KG/M2

## 2020-03-09 DIAGNOSIS — G89.18 OTHER ACUTE POSTPROCEDURAL PAIN: ICD-10-CM

## 2020-03-09 PROCEDURE — 99024 POSTOP FOLLOW-UP VISIT: CPT

## 2020-03-09 RX ORDER — OXYCODONE AND ACETAMINOPHEN 5; 325 MG/1; MG/1
5-325 TABLET ORAL
Qty: 15 | Refills: 0 | Status: ACTIVE | COMMUNITY
Start: 2020-03-09 | End: 1900-01-01

## 2020-05-04 ENCOUNTER — APPOINTMENT (OUTPATIENT)
Dept: SURGERY | Facility: CLINIC | Age: 70
End: 2020-05-04
Payer: MEDICARE

## 2020-05-04 VITALS
HEART RATE: 69 BPM | HEIGHT: 69 IN | TEMPERATURE: 98 F | WEIGHT: 140 LBS | BODY MASS INDEX: 20.73 KG/M2 | SYSTOLIC BLOOD PRESSURE: 123 MMHG | DIASTOLIC BLOOD PRESSURE: 78 MMHG

## 2020-05-04 DIAGNOSIS — Z98.890 OTHER SPECIFIED POSTPROCEDURAL STATES: ICD-10-CM

## 2020-05-04 PROCEDURE — 99024 POSTOP FOLLOW-UP VISIT: CPT

## 2020-05-04 RX ORDER — ESCITALOPRAM OXALATE 5 MG/1
TABLET, FILM COATED ORAL
Refills: 0 | Status: ACTIVE | COMMUNITY

## 2020-05-04 RX ORDER — ALPRAZOLAM 2 MG/1
TABLET ORAL
Refills: 0 | Status: ACTIVE | COMMUNITY

## 2020-08-17 ENCOUNTER — APPOINTMENT (OUTPATIENT)
Dept: SURGERY | Facility: CLINIC | Age: 70
End: 2020-08-17
Payer: MEDICARE

## 2020-08-17 VITALS — WEIGHT: 145 LBS | BODY MASS INDEX: 22.76 KG/M2 | HEIGHT: 67 IN

## 2020-08-17 VITALS — SYSTOLIC BLOOD PRESSURE: 143 MMHG | HEART RATE: 73 BPM | DIASTOLIC BLOOD PRESSURE: 62 MMHG | TEMPERATURE: 98.3 F

## 2020-08-17 PROCEDURE — 99212 OFFICE O/P EST SF 10 MIN: CPT

## 2022-09-08 NOTE — H&P PST ADULT - NEGATIVE HEMATOLOGY SYMPTOMS
Problem: Ineffective Coping  Goal: Participates in unit activities  Description: Interventions:  - Provide therapeutic environment   - Provide required programming   - Redirect inappropriate behaviors   Outcome: Progressing no gum bleeding/no nose bleeding/no skin lumps

## 2023-10-10 NOTE — ED ADULT NURSE NOTE - NSFALLRSKASSESSDT_ED_ALL_ED
29-Nov-2019 20:31 Prednisone Pregnancy And Lactation Text: This medication is Pregnancy Category C and it isn't know if it is safe during pregnancy. This medication is excreted in breast milk.

## 2023-11-21 RX ORDER — ESCITALOPRAM OXALATE 10 MG/1
1 TABLET, FILM COATED ORAL
Qty: 0 | Refills: 0 | DISCHARGE

## 2023-11-21 RX ORDER — FAMOTIDINE 10 MG/ML
0 INJECTION INTRAVENOUS
Qty: 0 | Refills: 0 | DISCHARGE

## 2023-11-21 RX ORDER — ALPRAZOLAM 0.25 MG
1 TABLET ORAL
Qty: 0 | Refills: 0 | DISCHARGE

## 2024-10-04 NOTE — ASU PREOP CHECKLIST - LAST TOOK
Initial Clinical Review    Admission: Date/Time/Statement:   Admission Orders (From admission, onward)       Ordered        10/03/24 2014  Place in Observation  Once                          Orders Placed This Encounter   Procedures    Place in Observation     Standing Status:   Standing     Number of Occurrences:   1     Order Specific Question:   Level of Care     Answer:   Med Surg [16]     ED Arrival Information       Expected   -    Arrival   10/3/2024 17:34    Acuity   Urgent              Means of arrival   Walk-In    Escorted by   Self    Service   Hospitalist    Admission type   Emergency              Arrival complaint   Cat Bite,Wound Check             Chief Complaint   Patient presents with    Wound Check     Pt arrives c/o drainage and redness to previous cat bite. Pt seen in ED and prescribed antibiotics. Denies fevers       Initial Presentation: 30 y.o. female to the ED from home with complaints of drainage, redness from cat bite area.  Seen in the ED 10/2, wound irrigated, started pm abx. Redness extending beyond perimeters which were drawn the day prior. Admitted under observation for cellulitis RUE. Xray forearm neg.  Started on IV abx in the ED.       Date: 10/4   Day 2:      ED Treatment-Medication Administration from 10/03/2024 1734 to 10/03/2024 2049         Date/Time Order Dose Route Action     10/03/2024 1853 ampicillin-sulbactam (UNASYN) 3 g in sodium chloride 0.9 % 100 mL IVPB 3 g Intravenous New Bag     10/03/2024 2013 acetaminophen (Ofirmev) injection 1,000 mg 1,000 mg Intravenous New Bag     10/03/2024 2013 ketorolac (TORADOL) injection 15 mg 15 mg Intravenous Given     10/03/2024 2013 morphine injection 2 mg 2 mg Intravenous Given            Scheduled Medications:  cefTRIAXone, 1,000 mg, Intravenous, Q24H  enoxaparin, 40 mg, Subcutaneous, Daily  metroNIDAZOLE, 500 mg, Oral, Q8H [facility]  norgestrel-ethinyl estradiol, 1 tablet, Oral, Daily      Continuous IV Infusions:     PRN  "Meds:  acetaminophen, 650 mg, Oral, Q6H PRN  aluminum-magnesium hydroxide-simethicone, 30 mL, Oral, Q6H PRN  diphenhydrAMINE, 25 mg, Oral, Q6H PRN  ketorolac, 15 mg, Intravenous, Q6H PRN      ED Triage Vitals   Temperature Pulse Respirations Blood Pressure SpO2 Pain Score   10/03/24 1737 10/03/24 1737 10/03/24 1737 10/03/24 1737 10/03/24 1737 10/03/24 1929   98 °F (36.7 °C) 98 18 124/65 99 % 6     Weight (last 2 days)       Date/Time Weight    10/03/24 2247 75.9 (167.33)    10/03/24 1737 77.1 (170)            Vital Signs (last 3 days)       Date/Time Temp Pulse Resp BP MAP (mmHg) SpO2 O2 Device Patient Position - Orthostatic VS Pain    10/03/24 21:00:59 98.6 °F (37 °C) 74 18 111/73 86 -- -- Lying No Pain    10/03/24 2013 -- 74 20 130/85 -- 98 % None (Room air) Sitting 6    10/03/24 1929 -- 79 20 116/72 -- 97 % None (Room air) Lying 6    10/03/24 1737 98 °F (36.7 °C) 98 18 124/65 -- 99 % None (Room air) Sitting --              Pertinent Labs/Diagnostic Test Results:   Radiology:  No orders to display     Cardiology:  No orders to display     GI:  No orders to display           Results from last 7 days   Lab Units 10/03/24  1848   WBC Thousand/uL 7.37   HEMOGLOBIN g/dL 14.5   HEMATOCRIT % 44.3   PLATELETS Thousands/uL 354   TOTAL NEUT ABS Thousands/µL 5.12         Results from last 7 days   Lab Units 10/03/24  1848   SODIUM mmol/L 138   POTASSIUM mmol/L 4.0   CHLORIDE mmol/L 104   CO2 mmol/L 24   ANION GAP mmol/L 10   BUN mg/dL 11   CREATININE mg/dL 0.76   EGFR ml/min/1.73sq m 104   CALCIUM mg/dL 9.4     Results from last 7 days   Lab Units 10/03/24  1848   AST U/L 12*   ALT U/L 10   ALK PHOS U/L 43   TOTAL PROTEIN g/dL 8.3   ALBUMIN g/dL 4.6   TOTAL BILIRUBIN mg/dL 0.60         Results from last 7 days   Lab Units 10/03/24  1848   GLUCOSE RANDOM mg/dL 85             No results found for: \"BETA-HYDROXYBUTYRATE\"                                   Results from last 7 days   Lab Units 10/03/24  1848   PROCALCITONIN ng/ml " <0.05     Results from last 7 days   Lab Units 10/03/24  1848   LACTIC ACID mmol/L 0.8                                           Results from last 7 days   Lab Units 10/03/24  1851 10/03/24  1848   BLOOD CULTURE  Received in Microbiology Lab. Culture in Progress. Received in Microbiology Lab. Culture in Progress.           Admitting Diagnosis: Cellulitis [L03.90]  Visit for wound check [Z51.89]  Age/Sex: 30 y.o. female    Network Utilization Review Department  ATTENTION: Please call with any questions or concerns to [phone] and carefully listen to the prompts so that you are directed to the right person. All voicemails are confidential.   For Discharge needs, contact Care Management DC Support Team at [phone] opt. 2  Send all requests for admission clinical reviews, approved or denied determinations and any other requests to dedicated fax number below belonging to the campus where the patient is receiving treatment. List of dedicated fax numbers for the Facilities:  FACILITY NAME UR FAX NUMBER   ADMISSION DENIALS (Administrative/Medical Necessity) [phone]   DISCHARGE SUPPORT TEAM (NETWORK) [phone]   PARENT CHILD HEALTH (Maternity/NICU/Pediatrics) [phone]   [facility] [phone]   [facility] [phone]   [facility] [phone]   [facility] [phone]   [facility] [phone]   [facility] [phone]   [facility] [phone]   [facility] [phone]   [facility] [phone]   [facility] [phone]   [facility] [phone]   [facility] [phone]          clears

## 2024-11-23 NOTE — ED ADULT NURSE NOTE - OBJECTIVE STATEMENT
Benefits, risks, and possible complications of procedure explained to patient/caregiver who verbalized understanding and gave verbal consent. 69y Female A&Ox3 came to ED c/o staple removal. PSH of colonoscopy on Saturday.  Pt states she had ABD surgery on Sat with st 69y Female A&Ox3 came to ED c/o staple removal. PSH of colonoscopy on Saturday.  Pt states she had ABD surgery on Sat with staples and ostomy bag, Pt states she found 1 staple removed on lower ABD 1 hours before coming in with white and clear discharge and erythema around site.  Pt presents 5/10 lower ABD pain with 1 staple removed on lower ABD white discharge with erythema around area and slight swelling, no bleeding, ecchymosis.  Denies chest pain, sob, ha, n/v/d, f/c, urinary symptoms, hematuria.

## 2024-12-09 NOTE — PHYSICAL THERAPY INITIAL EVALUATION ADULT - PHYSICAL ASSIST/NONPHYSICAL ASSIST: GAIT, REHAB EVAL
verbal cues/nonverbal cues (demo/gestures)/1 person assist You can access the FollowMyHealth Patient Portal offered by [facility] by registering at the following website: [email or web address]. By joining [facility]’s FollowMyHealth portal, you will also be able to view your health information using other applications (apps) compatible with our system.

## 2025-03-11 NOTE — H&P PST ADULT - BIRTH SEX
Ptn has been out of medication, she did call on Thursday asking for a refill and nothing has been sent out.    Female